# Patient Record
Sex: MALE | Race: WHITE | NOT HISPANIC OR LATINO | Employment: FULL TIME | URBAN - METROPOLITAN AREA
[De-identification: names, ages, dates, MRNs, and addresses within clinical notes are randomized per-mention and may not be internally consistent; named-entity substitution may affect disease eponyms.]

---

## 2017-01-03 ENCOUNTER — COMMUNICATION - HEALTHEAST (OUTPATIENT)
Dept: INTERNAL MEDICINE | Facility: CLINIC | Age: 52
End: 2017-01-03

## 2017-01-03 DIAGNOSIS — G44.1 VASCULAR HEADACHE: ICD-10-CM

## 2017-01-27 ENCOUNTER — OFFICE VISIT - HEALTHEAST (OUTPATIENT)
Dept: INTERNAL MEDICINE | Facility: CLINIC | Age: 52
End: 2017-01-27

## 2017-01-27 ENCOUNTER — RECORDS - HEALTHEAST (OUTPATIENT)
Dept: GENERAL RADIOLOGY | Facility: CLINIC | Age: 52
End: 2017-01-27

## 2017-01-27 ENCOUNTER — RECORDS - HEALTHEAST (OUTPATIENT)
Dept: ADMINISTRATIVE | Facility: OTHER | Age: 52
End: 2017-01-27

## 2017-01-27 DIAGNOSIS — I10 HYPERTENSION: ICD-10-CM

## 2017-01-27 DIAGNOSIS — R05.3 CHRONIC COUGH: ICD-10-CM

## 2017-01-27 DIAGNOSIS — M70.60 TROCHANTERIC BURSITIS: ICD-10-CM

## 2017-01-27 DIAGNOSIS — R53.83 FATIGUE: ICD-10-CM

## 2017-01-27 DIAGNOSIS — R05.9 COUGH: ICD-10-CM

## 2017-01-27 DIAGNOSIS — R06.83 SNORING: ICD-10-CM

## 2017-01-27 ASSESSMENT — MIFFLIN-ST. JEOR: SCORE: 2255.31

## 2017-02-06 ENCOUNTER — COMMUNICATION - HEALTHEAST (OUTPATIENT)
Dept: INTERNAL MEDICINE | Facility: CLINIC | Age: 52
End: 2017-02-06

## 2017-02-16 ENCOUNTER — OFFICE VISIT - HEALTHEAST (OUTPATIENT)
Dept: SLEEP MEDICINE | Facility: CLINIC | Age: 52
End: 2017-02-16

## 2017-02-16 DIAGNOSIS — Z91.89 AT RISK FOR SLEEP APNEA: ICD-10-CM

## 2017-02-16 DIAGNOSIS — G47.50 PARASOMNIA: ICD-10-CM

## 2017-02-16 DIAGNOSIS — G47.10 HYPERSOMNIA, UNSPECIFIED: ICD-10-CM

## 2017-02-16 DIAGNOSIS — R06.83 SNORING: ICD-10-CM

## 2017-02-16 ASSESSMENT — MIFFLIN-ST. JEOR: SCORE: 2264.21

## 2017-03-03 ENCOUNTER — RECORDS - HEALTHEAST (OUTPATIENT)
Dept: SLEEP MEDICINE | Facility: CLINIC | Age: 52
End: 2017-03-03

## 2017-03-03 DIAGNOSIS — G47.50 PARASOMNIA, UNSPECIFIED: ICD-10-CM

## 2017-03-03 DIAGNOSIS — Z91.89 OTHER SPECIFIED PERSONAL RISK FACTORS, NOT ELSEWHERE CLASSIFIED: ICD-10-CM

## 2017-03-03 DIAGNOSIS — G47.10 HYPERSOMNIA, UNSPECIFIED: ICD-10-CM

## 2017-03-03 DIAGNOSIS — R06.83 SNORING: ICD-10-CM

## 2017-03-20 ENCOUNTER — AMBULATORY - HEALTHEAST (OUTPATIENT)
Dept: SLEEP MEDICINE | Facility: CLINIC | Age: 52
End: 2017-03-20

## 2017-03-21 ENCOUNTER — COMMUNICATION - HEALTHEAST (OUTPATIENT)
Dept: SLEEP MEDICINE | Facility: CLINIC | Age: 52
End: 2017-03-21

## 2017-03-22 ENCOUNTER — AMBULATORY - HEALTHEAST (OUTPATIENT)
Dept: SLEEP MEDICINE | Facility: CLINIC | Age: 52
End: 2017-03-22

## 2017-03-22 ENCOUNTER — OFFICE VISIT - HEALTHEAST (OUTPATIENT)
Dept: SLEEP MEDICINE | Facility: CLINIC | Age: 52
End: 2017-03-22

## 2017-03-22 DIAGNOSIS — G47.33 OSA (OBSTRUCTIVE SLEEP APNEA): ICD-10-CM

## 2017-03-22 ASSESSMENT — MIFFLIN-ST. JEOR: SCORE: 2264.21

## 2017-03-25 ENCOUNTER — COMMUNICATION - HEALTHEAST (OUTPATIENT)
Dept: INTERNAL MEDICINE | Facility: CLINIC | Age: 52
End: 2017-03-25

## 2017-03-27 ENCOUNTER — COMMUNICATION - HEALTHEAST (OUTPATIENT)
Dept: INTERNAL MEDICINE | Facility: CLINIC | Age: 52
End: 2017-03-27

## 2017-03-27 DIAGNOSIS — F32.A CHRONIC DEPRESSION: ICD-10-CM

## 2017-05-08 ENCOUNTER — OFFICE VISIT - HEALTHEAST (OUTPATIENT)
Dept: FAMILY MEDICINE | Facility: CLINIC | Age: 52
End: 2017-05-08

## 2017-05-08 DIAGNOSIS — M54.50 LOW BACK PAIN: ICD-10-CM

## 2017-05-31 ENCOUNTER — OFFICE VISIT - HEALTHEAST (OUTPATIENT)
Dept: SLEEP MEDICINE | Facility: CLINIC | Age: 52
End: 2017-05-31

## 2017-05-31 DIAGNOSIS — G47.33 OSA ON CPAP: ICD-10-CM

## 2017-05-31 DIAGNOSIS — R09.81 NASAL CONGESTION: ICD-10-CM

## 2017-05-31 ASSESSMENT — MIFFLIN-ST. JEOR: SCORE: 2241.53

## 2017-06-09 ENCOUNTER — OFFICE VISIT - HEALTHEAST (OUTPATIENT)
Dept: INTERNAL MEDICINE | Facility: CLINIC | Age: 52
End: 2017-06-09

## 2017-06-09 DIAGNOSIS — F32.A CHRONIC DEPRESSION: ICD-10-CM

## 2017-06-09 DIAGNOSIS — E66.811 OBESITY (BMI 30.0-34.9): ICD-10-CM

## 2017-06-09 DIAGNOSIS — Z00.00 ROUTINE GENERAL MEDICAL EXAMINATION AT A HEALTH CARE FACILITY: ICD-10-CM

## 2017-06-09 DIAGNOSIS — G47.33 OSA (OBSTRUCTIVE SLEEP APNEA): ICD-10-CM

## 2017-06-09 LAB
CHOLEST SERPL-MCNC: 137 MG/DL
FASTING STATUS PATIENT QL REPORTED: YES
HDLC SERPL-MCNC: 37 MG/DL
LDLC SERPL CALC-MCNC: 84 MG/DL
PSA SERPL-MCNC: 0.8 NG/ML (ref 0–2.5)
TRIGL SERPL-MCNC: 81 MG/DL

## 2017-06-09 ASSESSMENT — MIFFLIN-ST. JEOR: SCORE: 2247.79

## 2018-02-02 ENCOUNTER — COMMUNICATION - HEALTHEAST (OUTPATIENT)
Dept: INTERNAL MEDICINE | Facility: CLINIC | Age: 53
End: 2018-02-02

## 2018-02-02 DIAGNOSIS — G44.1 VASCULAR HEADACHE: ICD-10-CM

## 2018-02-27 ENCOUNTER — COMMUNICATION - HEALTHEAST (OUTPATIENT)
Dept: INTERNAL MEDICINE | Facility: CLINIC | Age: 53
End: 2018-02-27

## 2018-02-28 ENCOUNTER — AMBULATORY - HEALTHEAST (OUTPATIENT)
Dept: INTERNAL MEDICINE | Facility: CLINIC | Age: 53
End: 2018-02-28

## 2018-02-28 DIAGNOSIS — K40.90 INGUINAL HERNIA: ICD-10-CM

## 2018-03-13 ENCOUNTER — RECORDS - HEALTHEAST (OUTPATIENT)
Dept: ADMINISTRATIVE | Facility: OTHER | Age: 53
End: 2018-03-13

## 2018-04-01 ENCOUNTER — COMMUNICATION - HEALTHEAST (OUTPATIENT)
Dept: INTERNAL MEDICINE | Facility: CLINIC | Age: 53
End: 2018-04-01

## 2018-04-01 DIAGNOSIS — F32.A CHRONIC DEPRESSION: ICD-10-CM

## 2018-05-29 ENCOUNTER — COMMUNICATION - HEALTHEAST (OUTPATIENT)
Dept: INTERNAL MEDICINE | Facility: CLINIC | Age: 53
End: 2018-05-29

## 2018-05-30 ENCOUNTER — OFFICE VISIT - HEALTHEAST (OUTPATIENT)
Dept: INTERNAL MEDICINE | Facility: CLINIC | Age: 53
End: 2018-05-30

## 2018-05-30 DIAGNOSIS — E66.811 OBESITY (BMI 30.0-34.9): ICD-10-CM

## 2018-05-30 DIAGNOSIS — G47.33 OSA (OBSTRUCTIVE SLEEP APNEA): ICD-10-CM

## 2018-05-30 DIAGNOSIS — Z12.11 SCREEN FOR COLON CANCER: ICD-10-CM

## 2018-05-30 DIAGNOSIS — F32.A CHRONIC DEPRESSION: ICD-10-CM

## 2018-05-30 DIAGNOSIS — K40.90 INGUINAL HERNIA: ICD-10-CM

## 2018-05-30 LAB
ALBUMIN SERPL-MCNC: 4 G/DL (ref 3.5–5)
ALP SERPL-CCNC: 62 U/L (ref 45–120)
ALT SERPL W P-5'-P-CCNC: 23 U/L (ref 0–45)
ANION GAP SERPL CALCULATED.3IONS-SCNC: 7 MMOL/L (ref 5–18)
AST SERPL W P-5'-P-CCNC: 18 U/L (ref 0–40)
ATRIAL RATE - MUSE: 62 BPM
BASOPHILS # BLD AUTO: 0.1 THOU/UL (ref 0–0.2)
BASOPHILS NFR BLD AUTO: 1 % (ref 0–2)
BILIRUB SERPL-MCNC: 0.5 MG/DL (ref 0–1)
BUN SERPL-MCNC: 17 MG/DL (ref 8–22)
CALCIUM SERPL-MCNC: 9.7 MG/DL (ref 8.5–10.5)
CHLORIDE BLD-SCNC: 107 MMOL/L (ref 98–107)
CO2 SERPL-SCNC: 27 MMOL/L (ref 22–31)
CREAT SERPL-MCNC: 1.25 MG/DL (ref 0.7–1.3)
DIASTOLIC BLOOD PRESSURE - MUSE: NORMAL MMHG
EOSINOPHIL # BLD AUTO: 0.2 THOU/UL (ref 0–0.4)
EOSINOPHIL NFR BLD AUTO: 2 % (ref 0–6)
ERYTHROCYTE [DISTWIDTH] IN BLOOD BY AUTOMATED COUNT: 11.5 % (ref 11–14.5)
GFR SERPL CREATININE-BSD FRML MDRD: >60 ML/MIN/1.73M2
GLUCOSE BLD-MCNC: 98 MG/DL (ref 70–125)
HCT VFR BLD AUTO: 44.4 % (ref 40–54)
HGB BLD-MCNC: 15.2 G/DL (ref 14–18)
INTERPRETATION ECG - MUSE: NORMAL
LYMPHOCYTES # BLD AUTO: 2.8 THOU/UL (ref 0.8–4.4)
LYMPHOCYTES NFR BLD AUTO: 30 % (ref 20–40)
MCH RBC QN AUTO: 31.6 PG (ref 27–34)
MCHC RBC AUTO-ENTMCNC: 34.1 G/DL (ref 32–36)
MCV RBC AUTO: 93 FL (ref 80–100)
MONOCYTES # BLD AUTO: 0.8 THOU/UL (ref 0–0.9)
MONOCYTES NFR BLD AUTO: 8 % (ref 2–10)
NEUTROPHILS # BLD AUTO: 5.4 THOU/UL (ref 2–7.7)
NEUTROPHILS NFR BLD AUTO: 59 % (ref 50–70)
P AXIS - MUSE: 68 DEGREES
PLATELET # BLD AUTO: 256 THOU/UL (ref 140–440)
PMV BLD AUTO: 8.1 FL (ref 7–10)
POTASSIUM BLD-SCNC: 4.2 MMOL/L (ref 3.5–5)
PR INTERVAL - MUSE: 186 MS
PROT SERPL-MCNC: 7.1 G/DL (ref 6–8)
QRS DURATION - MUSE: 86 MS
QT - MUSE: 396 MS
QTC - MUSE: 401 MS
R AXIS - MUSE: -4 DEGREES
RBC # BLD AUTO: 4.8 MILL/UL (ref 4.4–6.2)
SODIUM SERPL-SCNC: 141 MMOL/L (ref 136–145)
SYSTOLIC BLOOD PRESSURE - MUSE: NORMAL MMHG
T AXIS - MUSE: 5 DEGREES
VENTRICULAR RATE- MUSE: 62 BPM
WBC: 9.2 THOU/UL (ref 4–11)

## 2018-05-30 ASSESSMENT — MIFFLIN-ST. JEOR: SCORE: 2243.97

## 2018-06-19 ENCOUNTER — RECORDS - HEALTHEAST (OUTPATIENT)
Dept: ADMINISTRATIVE | Facility: OTHER | Age: 53
End: 2018-06-19

## 2018-07-03 ENCOUNTER — COMMUNICATION - HEALTHEAST (OUTPATIENT)
Dept: INTERNAL MEDICINE | Facility: CLINIC | Age: 53
End: 2018-07-03

## 2018-07-03 DIAGNOSIS — F32.A CHRONIC DEPRESSION: ICD-10-CM

## 2018-12-24 ENCOUNTER — COMMUNICATION - HEALTHEAST (OUTPATIENT)
Dept: INTERNAL MEDICINE | Facility: CLINIC | Age: 53
End: 2018-12-24

## 2018-12-24 DIAGNOSIS — F32.A CHRONIC DEPRESSION: ICD-10-CM

## 2018-12-26 ENCOUNTER — COMMUNICATION - HEALTHEAST (OUTPATIENT)
Dept: INTERNAL MEDICINE | Facility: CLINIC | Age: 53
End: 2018-12-26

## 2018-12-26 DIAGNOSIS — J11.1 INFLUENZA: ICD-10-CM

## 2019-04-24 ENCOUNTER — COMMUNICATION - HEALTHEAST (OUTPATIENT)
Dept: INTERNAL MEDICINE | Facility: CLINIC | Age: 54
End: 2019-04-24

## 2019-04-24 DIAGNOSIS — F32.A CHRONIC DEPRESSION: ICD-10-CM

## 2019-04-29 ENCOUNTER — COMMUNICATION - HEALTHEAST (OUTPATIENT)
Dept: INTERNAL MEDICINE | Facility: CLINIC | Age: 54
End: 2019-04-29

## 2019-04-29 DIAGNOSIS — F32.A CHRONIC DEPRESSION: ICD-10-CM

## 2019-05-15 ENCOUNTER — OFFICE VISIT - HEALTHEAST (OUTPATIENT)
Dept: INTERNAL MEDICINE | Facility: CLINIC | Age: 54
End: 2019-05-15

## 2019-05-15 DIAGNOSIS — G44.1 VASCULAR HEADACHE: ICD-10-CM

## 2019-05-15 DIAGNOSIS — E66.811 OBESITY (BMI 30.0-34.9): ICD-10-CM

## 2019-05-15 DIAGNOSIS — Z00.00 ROUTINE GENERAL MEDICAL EXAMINATION AT A HEALTH CARE FACILITY: ICD-10-CM

## 2019-05-15 DIAGNOSIS — G47.33 OSA (OBSTRUCTIVE SLEEP APNEA): ICD-10-CM

## 2019-05-15 LAB
ALBUMIN SERPL-MCNC: 4.4 G/DL (ref 3.5–5)
ALBUMIN UR-MCNC: NEGATIVE MG/DL
ALP SERPL-CCNC: 70 U/L (ref 45–120)
ALT SERPL W P-5'-P-CCNC: 22 U/L (ref 0–45)
ANION GAP SERPL CALCULATED.3IONS-SCNC: 11 MMOL/L (ref 5–18)
APPEARANCE UR: CLEAR
AST SERPL W P-5'-P-CCNC: 18 U/L (ref 0–40)
BASOPHILS # BLD AUTO: 0 THOU/UL (ref 0–0.2)
BASOPHILS NFR BLD AUTO: 0 % (ref 0–2)
BILIRUB DIRECT SERPL-MCNC: 0.3 MG/DL
BILIRUB SERPL-MCNC: 0.9 MG/DL (ref 0–1)
BILIRUB UR QL STRIP: NEGATIVE
BUN SERPL-MCNC: 13 MG/DL (ref 8–22)
CALCIUM SERPL-MCNC: 10.1 MG/DL (ref 8.5–10.5)
CHLORIDE BLD-SCNC: 103 MMOL/L (ref 98–107)
CHOLEST SERPL-MCNC: 141 MG/DL
CO2 SERPL-SCNC: 25 MMOL/L (ref 22–31)
COLOR UR AUTO: YELLOW
CREAT SERPL-MCNC: 1.11 MG/DL (ref 0.7–1.3)
EOSINOPHIL # BLD AUTO: 0.1 THOU/UL (ref 0–0.4)
EOSINOPHIL NFR BLD AUTO: 1 % (ref 0–6)
ERYTHROCYTE [DISTWIDTH] IN BLOOD BY AUTOMATED COUNT: 12.4 % (ref 11–14.5)
ERYTHROCYTE [SEDIMENTATION RATE] IN BLOOD BY WESTERGREN METHOD: 2 MM/HR (ref 0–15)
FASTING STATUS PATIENT QL REPORTED: YES
GFR SERPL CREATININE-BSD FRML MDRD: >60 ML/MIN/1.73M2
GLUCOSE BLD-MCNC: 95 MG/DL (ref 70–125)
GLUCOSE UR STRIP-MCNC: NEGATIVE MG/DL
HCT VFR BLD AUTO: 48.5 % (ref 40–54)
HDLC SERPL-MCNC: 47 MG/DL
HGB BLD-MCNC: 15.8 G/DL (ref 14–18)
HGB UR QL STRIP: NEGATIVE
KETONES UR STRIP-MCNC: NEGATIVE MG/DL
LDLC SERPL CALC-MCNC: 72 MG/DL
LEUKOCYTE ESTERASE UR QL STRIP: NEGATIVE
LYMPHOCYTES # BLD AUTO: 2.2 THOU/UL (ref 0.8–4.4)
LYMPHOCYTES NFR BLD AUTO: 20 % (ref 20–40)
MCH RBC QN AUTO: 30.7 PG (ref 27–34)
MCHC RBC AUTO-ENTMCNC: 32.6 G/DL (ref 32–36)
MCV RBC AUTO: 94 FL (ref 80–100)
MONOCYTES # BLD AUTO: 0.9 THOU/UL (ref 0–0.9)
MONOCYTES NFR BLD AUTO: 8 % (ref 2–10)
NEUTROPHILS # BLD AUTO: 7.8 THOU/UL (ref 2–7.7)
NEUTROPHILS NFR BLD AUTO: 71 % (ref 50–70)
NITRATE UR QL: NEGATIVE
PH UR STRIP: 5.5 [PH] (ref 4.5–8)
PLATELET # BLD AUTO: 291 THOU/UL (ref 140–440)
PMV BLD AUTO: 10.9 FL (ref 8.5–12.5)
POTASSIUM BLD-SCNC: 4.4 MMOL/L (ref 3.5–5)
PROT SERPL-MCNC: 7.2 G/DL (ref 6–8)
PSA SERPL-MCNC: 0.3 NG/ML (ref 0–3.5)
RBC # BLD AUTO: 5.15 MILL/UL (ref 4.4–6.2)
SODIUM SERPL-SCNC: 139 MMOL/L (ref 136–145)
SP GR UR STRIP: 1.01 (ref 1–1.03)
TRIGL SERPL-MCNC: 109 MG/DL
TSH SERPL DL<=0.005 MIU/L-ACNC: 1.03 UIU/ML (ref 0.3–5)
UROBILINOGEN UR STRIP-ACNC: NORMAL
WBC: 11.1 THOU/UL (ref 4–11)

## 2019-05-15 ASSESSMENT — MIFFLIN-ST. JEOR: SCORE: 2243.79

## 2019-06-10 ENCOUNTER — COMMUNICATION - HEALTHEAST (OUTPATIENT)
Dept: INTERNAL MEDICINE | Facility: CLINIC | Age: 54
End: 2019-06-10

## 2019-06-10 DIAGNOSIS — G44.1 VASCULAR HEADACHE: ICD-10-CM

## 2019-07-17 ENCOUNTER — RECORDS - HEALTHEAST (OUTPATIENT)
Dept: ADMINISTRATIVE | Facility: OTHER | Age: 54
End: 2019-07-17

## 2020-01-08 ENCOUNTER — AMBULATORY - HEALTHEAST (OUTPATIENT)
Dept: INTERNAL MEDICINE | Facility: CLINIC | Age: 55
End: 2020-01-08

## 2020-01-08 ENCOUNTER — COMMUNICATION - HEALTHEAST (OUTPATIENT)
Dept: SCHEDULING | Facility: CLINIC | Age: 55
End: 2020-01-08

## 2020-01-08 DIAGNOSIS — I10 HYPERTENSION, UNSPECIFIED TYPE: ICD-10-CM

## 2020-01-09 ENCOUNTER — COMMUNICATION - HEALTHEAST (OUTPATIENT)
Dept: INTERNAL MEDICINE | Facility: CLINIC | Age: 55
End: 2020-01-09

## 2020-01-31 ENCOUNTER — COMMUNICATION - HEALTHEAST (OUTPATIENT)
Dept: INTERNAL MEDICINE | Facility: CLINIC | Age: 55
End: 2020-01-31

## 2020-01-31 ENCOUNTER — OFFICE VISIT - HEALTHEAST (OUTPATIENT)
Dept: INTERNAL MEDICINE | Facility: CLINIC | Age: 55
End: 2020-01-31

## 2020-01-31 DIAGNOSIS — F32.A CHRONIC DEPRESSION: ICD-10-CM

## 2020-01-31 DIAGNOSIS — G44.209 TENSION HEADACHE: ICD-10-CM

## 2020-01-31 DIAGNOSIS — I10 HYPERTENSION, UNSPECIFIED TYPE: ICD-10-CM

## 2020-01-31 LAB
ANION GAP SERPL CALCULATED.3IONS-SCNC: 9 MMOL/L (ref 5–18)
BASOPHILS # BLD AUTO: 0.1 THOU/UL (ref 0–0.2)
BASOPHILS NFR BLD AUTO: 1 % (ref 0–2)
BUN SERPL-MCNC: 14 MG/DL (ref 8–22)
CALCIUM SERPL-MCNC: 9.3 MG/DL (ref 8.5–10.5)
CHLORIDE BLD-SCNC: 104 MMOL/L (ref 98–107)
CO2 SERPL-SCNC: 27 MMOL/L (ref 22–31)
CREAT SERPL-MCNC: 1.12 MG/DL (ref 0.7–1.3)
EOSINOPHIL # BLD AUTO: 0.2 THOU/UL (ref 0–0.4)
EOSINOPHIL NFR BLD AUTO: 2 % (ref 0–6)
ERYTHROCYTE [DISTWIDTH] IN BLOOD BY AUTOMATED COUNT: 12 % (ref 11–14.5)
GFR SERPL CREATININE-BSD FRML MDRD: >60 ML/MIN/1.73M2
GLUCOSE BLD-MCNC: 103 MG/DL (ref 70–125)
HCT VFR BLD AUTO: 45.7 % (ref 40–54)
HGB BLD-MCNC: 15.7 G/DL (ref 14–18)
LYMPHOCYTES # BLD AUTO: 2.7 THOU/UL (ref 0.8–4.4)
LYMPHOCYTES NFR BLD AUTO: 36 % (ref 20–40)
MCH RBC QN AUTO: 31.7 PG (ref 27–34)
MCHC RBC AUTO-ENTMCNC: 34.3 G/DL (ref 32–36)
MCV RBC AUTO: 92 FL (ref 80–100)
MONOCYTES # BLD AUTO: 0.6 THOU/UL (ref 0–0.9)
MONOCYTES NFR BLD AUTO: 8 % (ref 2–10)
NEUTROPHILS # BLD AUTO: 4 THOU/UL (ref 2–7.7)
NEUTROPHILS NFR BLD AUTO: 53 % (ref 50–70)
PLATELET # BLD AUTO: 238 THOU/UL (ref 140–440)
PMV BLD AUTO: 9 FL (ref 7–10)
POTASSIUM BLD-SCNC: 4.4 MMOL/L (ref 3.5–5)
RBC # BLD AUTO: 4.95 MILL/UL (ref 4.4–6.2)
SODIUM SERPL-SCNC: 140 MMOL/L (ref 136–145)
WBC: 7.5 THOU/UL (ref 4–11)

## 2020-01-31 RX ORDER — ASCORBIC ACID 500 MG
500 TABLET ORAL DAILY
Status: SHIPPED | COMMUNITY
Start: 2020-01-31 | End: 2023-11-13

## 2020-01-31 ASSESSMENT — MIFFLIN-ST. JEOR: SCORE: 2276.08

## 2020-02-05 ENCOUNTER — OFFICE VISIT - HEALTHEAST (OUTPATIENT)
Dept: INTERNAL MEDICINE | Facility: CLINIC | Age: 55
End: 2020-02-05

## 2020-02-06 ENCOUNTER — COMMUNICATION - HEALTHEAST (OUTPATIENT)
Dept: SCHEDULING | Facility: CLINIC | Age: 55
End: 2020-02-06

## 2020-02-14 ENCOUNTER — COMMUNICATION - HEALTHEAST (OUTPATIENT)
Dept: INTERNAL MEDICINE | Facility: CLINIC | Age: 55
End: 2020-02-14

## 2020-02-14 DIAGNOSIS — I10 HYPERTENSION, UNSPECIFIED TYPE: ICD-10-CM

## 2020-02-17 ENCOUNTER — COMMUNICATION - HEALTHEAST (OUTPATIENT)
Dept: INTERNAL MEDICINE | Facility: CLINIC | Age: 55
End: 2020-02-17

## 2020-02-17 DIAGNOSIS — G44.1 VASCULAR HEADACHE: ICD-10-CM

## 2020-09-20 ENCOUNTER — COMMUNICATION - HEALTHEAST (OUTPATIENT)
Dept: INTERNAL MEDICINE | Facility: CLINIC | Age: 55
End: 2020-09-20

## 2020-09-20 DIAGNOSIS — I10 HYPERTENSION, UNSPECIFIED TYPE: ICD-10-CM

## 2020-10-06 ENCOUNTER — COMMUNICATION - HEALTHEAST (OUTPATIENT)
Dept: INTERNAL MEDICINE | Facility: CLINIC | Age: 55
End: 2020-10-06

## 2020-10-06 DIAGNOSIS — I10 HYPERTENSION, UNSPECIFIED TYPE: ICD-10-CM

## 2020-10-09 RX ORDER — TRIAMTERENE AND HYDROCHLOROTHIAZIDE 37.5; 25 MG/1; MG/1
1 CAPSULE ORAL DAILY
Qty: 90 CAPSULE | Refills: 3 | Status: SHIPPED | OUTPATIENT
Start: 2020-10-09 | End: 2021-08-23

## 2020-11-05 ENCOUNTER — COMMUNICATION - HEALTHEAST (OUTPATIENT)
Dept: SCHEDULING | Facility: CLINIC | Age: 55
End: 2020-11-05

## 2021-02-01 DIAGNOSIS — G47.33 OBSTRUCTIVE SLEEP APNEA (ADULT) (PEDIATRIC): Primary | ICD-10-CM

## 2021-02-24 ENCOUNTER — COMMUNICATION - HEALTHEAST (OUTPATIENT)
Dept: INTERNAL MEDICINE | Facility: CLINIC | Age: 56
End: 2021-02-24

## 2021-02-24 DIAGNOSIS — G44.1 VASCULAR HEADACHE: ICD-10-CM

## 2021-02-25 ENCOUNTER — COMMUNICATION - HEALTHEAST (OUTPATIENT)
Dept: INTERNAL MEDICINE | Facility: CLINIC | Age: 56
End: 2021-02-25

## 2021-03-19 ENCOUNTER — AMBULATORY - HEALTHEAST (OUTPATIENT)
Dept: NURSING | Facility: CLINIC | Age: 56
End: 2021-03-19

## 2021-04-09 ENCOUNTER — OFFICE VISIT - HEALTHEAST (OUTPATIENT)
Dept: INTERNAL MEDICINE | Facility: CLINIC | Age: 56
End: 2021-04-09

## 2021-04-09 ENCOUNTER — AMBULATORY - HEALTHEAST (OUTPATIENT)
Dept: NURSING | Facility: CLINIC | Age: 56
End: 2021-04-09

## 2021-04-09 DIAGNOSIS — N40.1 BENIGN PROSTATIC HYPERPLASIA WITH NOCTURIA: ICD-10-CM

## 2021-04-09 DIAGNOSIS — G44.1 VASCULAR HEADACHE: ICD-10-CM

## 2021-04-09 DIAGNOSIS — Z13.220 SCREENING FOR HYPERLIPIDEMIA: ICD-10-CM

## 2021-04-09 DIAGNOSIS — E66.9 OBESITY (BMI 30-39.9): ICD-10-CM

## 2021-04-09 DIAGNOSIS — I10 ESSENTIAL HYPERTENSION: ICD-10-CM

## 2021-04-09 DIAGNOSIS — G43.009 MIGRAINE WITHOUT AURA AND WITHOUT STATUS MIGRAINOSUS, NOT INTRACTABLE: ICD-10-CM

## 2021-04-09 DIAGNOSIS — Z12.5 SCREENING PSA (PROSTATE SPECIFIC ANTIGEN): ICD-10-CM

## 2021-04-09 DIAGNOSIS — F41.9 ANXIETY: ICD-10-CM

## 2021-04-09 DIAGNOSIS — G47.33 OSA (OBSTRUCTIVE SLEEP APNEA): ICD-10-CM

## 2021-04-09 DIAGNOSIS — R35.1 BENIGN PROSTATIC HYPERPLASIA WITH NOCTURIA: ICD-10-CM

## 2021-04-09 DIAGNOSIS — Z12.11 SPECIAL SCREENING FOR MALIGNANT NEOPLASMS, COLON: ICD-10-CM

## 2021-04-09 DIAGNOSIS — E66.01 MORBID OBESITY (H): ICD-10-CM

## 2021-04-09 DIAGNOSIS — Z13.1 SCREENING FOR DIABETES MELLITUS: ICD-10-CM

## 2021-04-09 RX ORDER — SUMATRIPTAN 100 MG/1
TABLET, FILM COATED ORAL
Qty: 30 TABLET | Refills: 1 | Status: SHIPPED | OUTPATIENT
Start: 2021-04-09 | End: 2022-01-29

## 2021-04-20 ENCOUNTER — AMBULATORY - HEALTHEAST (OUTPATIENT)
Dept: FAMILY MEDICINE | Facility: CLINIC | Age: 56
End: 2021-04-20

## 2021-04-20 ENCOUNTER — COMMUNICATION - HEALTHEAST (OUTPATIENT)
Dept: FAMILY MEDICINE | Facility: CLINIC | Age: 56
End: 2021-04-20

## 2021-04-20 DIAGNOSIS — Z23 NEED FOR VACCINATION: ICD-10-CM

## 2021-04-26 ENCOUNTER — AMBULATORY - HEALTHEAST (OUTPATIENT)
Dept: LAB | Facility: CLINIC | Age: 56
End: 2021-04-26

## 2021-04-26 ENCOUNTER — AMBULATORY - HEALTHEAST (OUTPATIENT)
Dept: NURSING | Facility: CLINIC | Age: 56
End: 2021-04-26

## 2021-04-26 ENCOUNTER — COMMUNICATION - HEALTHEAST (OUTPATIENT)
Dept: FAMILY MEDICINE | Facility: CLINIC | Age: 56
End: 2021-04-26

## 2021-04-26 DIAGNOSIS — Z12.5 SCREENING PSA (PROSTATE SPECIFIC ANTIGEN): ICD-10-CM

## 2021-04-26 DIAGNOSIS — E66.9 OBESITY (BMI 30-39.9): ICD-10-CM

## 2021-04-26 DIAGNOSIS — Z23 NEED FOR VACCINATION: ICD-10-CM

## 2021-04-26 DIAGNOSIS — I10 ESSENTIAL HYPERTENSION: ICD-10-CM

## 2021-04-26 DIAGNOSIS — Z13.220 SCREENING FOR HYPERLIPIDEMIA: ICD-10-CM

## 2021-04-26 DIAGNOSIS — Z13.1 SCREENING FOR DIABETES MELLITUS: ICD-10-CM

## 2021-04-26 DIAGNOSIS — Z23 NEED FOR SHINGLES VACCINE: ICD-10-CM

## 2021-04-26 LAB
ALBUMIN SERPL-MCNC: 4.2 G/DL (ref 3.5–5)
ALP SERPL-CCNC: 67 U/L (ref 45–120)
ALT SERPL W P-5'-P-CCNC: 17 U/L (ref 0–45)
ANION GAP SERPL CALCULATED.3IONS-SCNC: 10 MMOL/L (ref 5–18)
AST SERPL W P-5'-P-CCNC: 17 U/L (ref 0–40)
BILIRUB SERPL-MCNC: 0.7 MG/DL (ref 0–1)
BUN SERPL-MCNC: 16 MG/DL (ref 8–22)
CALCIUM SERPL-MCNC: 9.6 MG/DL (ref 8.5–10.5)
CHLORIDE BLD-SCNC: 102 MMOL/L (ref 98–107)
CHOLEST SERPL-MCNC: 149 MG/DL
CO2 SERPL-SCNC: 25 MMOL/L (ref 22–31)
CREAT SERPL-MCNC: 1.06 MG/DL (ref 0.7–1.3)
ERYTHROCYTE [DISTWIDTH] IN BLOOD BY AUTOMATED COUNT: 12.2 % (ref 11–14.5)
FASTING STATUS PATIENT QL REPORTED: NO
GFR SERPL CREATININE-BSD FRML MDRD: >60 ML/MIN/1.73M2
GLUCOSE BLD-MCNC: 93 MG/DL (ref 70–125)
HBA1C MFR BLD: 5.5 %
HCT VFR BLD AUTO: 48 % (ref 40–54)
HDLC SERPL-MCNC: 46 MG/DL
HGB BLD-MCNC: 16.2 G/DL (ref 14–18)
LDLC SERPL CALC-MCNC: 83 MG/DL
MCH RBC QN AUTO: 30.8 PG (ref 27–34)
MCHC RBC AUTO-ENTMCNC: 33.8 G/DL (ref 32–36)
MCV RBC AUTO: 91 FL (ref 80–100)
PLATELET # BLD AUTO: 260 THOU/UL (ref 140–440)
PMV BLD AUTO: 10.3 FL (ref 7–10)
POTASSIUM BLD-SCNC: 4.1 MMOL/L (ref 3.5–5)
PROT SERPL-MCNC: 7.1 G/DL (ref 6–8)
PSA SERPL-MCNC: 0.4 NG/ML (ref 0–3.5)
RBC # BLD AUTO: 5.26 MILL/UL (ref 4.4–6.2)
SODIUM SERPL-SCNC: 137 MMOL/L (ref 136–145)
TRIGL SERPL-MCNC: 102 MG/DL
TSH SERPL DL<=0.005 MIU/L-ACNC: 0.89 UIU/ML (ref 0.3–5)
WBC: 9.5 THOU/UL (ref 4–11)

## 2021-04-27 ENCOUNTER — COMMUNICATION - HEALTHEAST (OUTPATIENT)
Dept: INTERNAL MEDICINE | Facility: CLINIC | Age: 56
End: 2021-04-27

## 2021-04-27 LAB — 25(OH)D3 SERPL-MCNC: 38.2 NG/ML (ref 30–80)

## 2021-05-28 NOTE — TELEPHONE ENCOUNTER
Due to be seen    Rx renewed per Medication Renewal Policy. Medication was last renewed on 12/24/18.    Soumya Sam, [facility] Triage/Med Refill 4/26/2019     Requested Prescriptions   Pending Prescriptions Disp Refills     buPROPion (WELLBUTRIN SR) 150 MG 12 hr tablet [Pharmacy Med Name: BUPROPION HCL SR TABS 150MG] 180 tablet 0     Sig: TAKE 1 TABLET TWICE A DAY       Tricyclics/Misc Antidepressant/Antianxiety Meds Refill Protocol Passed - 4/24/2019  7:17 AM        Passed - PCP or prescribing provider visit in last year     Last office visit with prescriber/PCP: Visit date not found OR same dept: Visit date not found OR same specialty: 1/27/2017 Lukas Vicente MD  Last physical: Visit date not found Last MTM visit: Visit date not found   Next visit within 3 mo: Visit date not found  Next physical within 3 mo: Visit date not found  Prescriber OR PCP: Lukas Muniz MD  Last diagnosis associated with med order: 1. Chronic depression  - buPROPion (WELLBUTRIN SR) 150 MG 12 hr tablet [Pharmacy Med Name: BUPROPION HCL SR TABS 150MG]; TAKE 1 TABLET TWICE A DAY  Dispense: 180 tablet; Refill: 0    If protocol passes may refill for 12 months if within 3 months of last provider visit (or a total of 15 months).

## 2021-05-28 NOTE — TELEPHONE ENCOUNTER
Medication Question or Clarification  Who is calling: Patient  What medication are you calling about?:  buPROPion (WELLBUTRIN SR) 150 MG 12 hr tablet  What dose do you take?:   How often are you taking the medication?:   Who prescribed the medication?: Lukas Vicente MD  What is your question/concern ?:  Pt needs 5 days supply until he receives his mail order RX  Pharmacy:   Okay to leave a detailed message?: Yes  Site CMT - Please call the pharmacy to obtain any additional needed information.

## 2021-05-28 NOTE — TELEPHONE ENCOUNTER
Prescription set up in a separate refill encounter to be signed by Dr Iraheta, the covering provider. Patient has been notified.    Twila Orantes, CMA

## 2021-05-28 NOTE — PROGRESS NOTES
HISTORY/PHYSICAL EXAM  Jose Peres   53 y.o. male  Is here for a physical healthcare maintenance examination        Assessment/Plan for  Jose Peres is a 53 y.o. male.       1.  Healthcare maintenance examination-healthy man.  Good exercise a bit more.  Dylan weight loss.  2.  Vascular headache-stable  3.  Obstructive sleep apnea-compliant with Pap.  4.  Obesity.  Discussed diet exercise weight loss.  5.  Skin rash-unclear etiology.  Does not appear to be rosacea.  Papular.  I will have him see Dr. Gilson Fowler  6.  Depression-on Rx.  Doing well-needs exercise  Plan:  1.  Continue current Rx-CPAP machine.,  As needed Imitrex  2.  Laboratory  3.  Annual exam  4.  Relaxation/stress techniques discussed    The following high BMI interventions were performed this visit: encouragement to exercise, weight monitoring and prescribed dietary intake    There are no Patient Instructions on file for this visit.    Diagnoses and all orders for this visit:    Routine general medical examination at a health care facility  -     [facility](CBC and Differential)  -     Erythrocyte Sedimentation Rate  -     Urinalysis-UC if Indicated  -     Basic Metabolic Panel  -     Hepatic Profile  -     Lipid [place]  -     Thyroid [place]  -     PSA (Prostatic-Specific Antigen), Annual Screen  -     [facility] (CBC with Diff)    Vascular headache  -     SUMAtriptan (IMITREX) 100 MG tablet; TAKE 1 TABLET (100 MG TOTAL) BY MOUTH EVERY 2 (TWO) HOURS AS NEEDED FOR MIGRAINE.  Dispense: 10 tablet; Refill: 3    GARCÍA (obstructive sleep apnea)    Obesity (BMI 30.0-34.9)            Medications:  Medications after visit  Current Outpatient Medications   Medication Sig Dispense Refill     aspirin 81 MG EC tablet Take 81 mg by mouth daily.       buPROPion (WELLBUTRIN SR) 150 MG 12 hr tablet TAKE 1 TABLET TWICE A DAY 10 tablet 3     fluticasone (FLONASE) 50 mcg/actuation nasal spray 1 spray into each nostril daily. 16 g 12     multivitamin therapeutic (THERAGRAN) tablet  Take 1 tablet by mouth daily.       OMEGA-3-DHA-EPA-TUNA OIL ORAL Take by mouth daily.       SUMAtriptan (IMITREX) 100 MG tablet TAKE 1 TABLET (100 MG TOTAL) BY MOUTH EVERY 2 (TWO) HOURS AS NEEDED FOR MIGRAINE. 10 tablet 3     No current facility-administered medications for this visit.      In addition to the physical examination-his skin lesion was addressed and referral made, his medication refilled, reviewed history of depression obstructive sleep apnea and renewed medication.  This provider spent greater than 25-minute min. face-to-face time with the patient and/or his family.  More than half this time was spent in counseling and or coordination of care which was consistent with the nature of this patient's problems which are listed and described in the assessment and plan.       Lukas Vicente MD  Internal medicine  [facility]  [phone]  [email or web address]      This is an electronically verified report by Lukas Vicente M.D.  (Note created with Dragon voice recognition and unintended spelling errors and word substitutions may occur)        SUBJECTIVE/HPI    Chief Complaint:  Annual Exam (fasting)  Patient here for annual exam    He is doing well  His hernia surgery went well  Were no surgical or anesthesia complications    Patient did change jobs within [employer]  He is working many hours  He does not travel that much  There is stress  He is having some trouble sleeping    He does have migraine headaches.  They have not been exacerbated.  He will take Imitrex as needed with good effect.    He is not exercising as much  He does have a history of depression  He remains on chronic Wellbutrin 150 mg p.o. twice daily and is doing well  He is active at his [facility]        Review of Systems:   Extensive 14-point comprehensive review of systems was performed.   Patient reports  1.  Appetite is good  2.  Bowels regular  3.  Urination okay  4.  No inguinal  pain    Otherwise, the following systems are negative including constitutional, eyes, ears, nose and throat, cardiovascular, respiratory, gastrointestinal, genitourinary, musculoskeletal,neurological, skin and/or breast, endocrine, hematologic/lymph, allergic/immunologic and psychiatric.  Surgical History  Past Surgical History:   Procedure Laterality Date     KNEE ARTHROSCOPY Bilateral      PILONIDAL ABSCESS         Medical History     Past Medical History:   Diagnosis Date     Depression      HTN (hypertension)      Migraines      Nephrolithiasis 2008     Obesity (BMI 30-39.9)      OCD (obsessive compulsive disorder)      GARCÍA (obstructive sleep apnea)      Patient Active Problem List    Diagnosis Date Noted     GARCÍA (obstructive sleep apnea) 06/09/2017     Chronic cough 01/27/2017     Hypertension 01/27/2017     Chronic depression 11/25/2015     Obesity (BMI 30.0-34.9) 11/25/2015        Family History  Family History   Problem Relation Age of Onset     Hypertension Mother      Hypertension Father      Glaucoma Father         1944     Heart disease Unknown 40        FAMILY             Medications:  Current Outpatient Medications on File Prior to Visit   Medication Sig     aspirin 81 MG EC tablet Take 81 mg by mouth daily.     buPROPion (WELLBUTRIN SR) 150 MG 12 hr tablet TAKE 1 TABLET TWICE A DAY     fluticasone (FLONASE) 50 mcg/actuation nasal spray 1 spray into each nostril daily.     multivitamin therapeutic (THERAGRAN) tablet Take 1 tablet by mouth daily.     OMEGA-3-DHA-EPA-TUNA OIL ORAL Take by mouth daily.     [DISCONTINUED] buPROPion (WELLBUTRIN SR) 150 MG 12 hr tablet Take 150 mg by mouth.     [DISCONTINUED] cholecalciferol, vitamin D3, 1,000 unit tablet Take 1,000 Units by mouth daily.     [DISCONTINUED] SUMAtriptan (IMITREX) 100 MG tablet TAKE 1 TABLET (100 MG TOTAL) BY MOUTH EVERY 2 (TWO) HOURS AS NEEDED FOR MIGRAINE.     [DISCONTINUED] UNABLE TO FIND daily. Med Name: Catalyn (natural supplement)      "[DISCONTINUED] aspirin 81 MG EC tablet Take 81 mg by mouth.     [DISCONTINUED] multivitamin (ONE A DAY) per tablet Take 1 tablet by mouth.     No current facility-administered medications on file prior to visit.           Allergies:  Allergies   Allergen Reactions     Lisinopril Cough       PSFHx:   Social History     Socioeconomic History     Marital status:      Spouse name: Not on file     Number of children: Not on file     Years of education: Not on file     Highest education level: Not on file   Occupational History     Not on file   Social Needs     Financial resource strain: Not on file     Food insecurity:     Worry: Not on file     Inability: Not on file     Transportation needs:     Medical: Not on file     Non-medical: Not on file   Tobacco Use     Smoking status: Never Smoker     Smokeless tobacco: Never Used   Substance and Sexual Activity     Alcohol use: Yes     Drug use: Not on file     Sexual activity: Not on file   Lifestyle     Physical activity:     Days per week: Not on file     Minutes per session: Not on file     Stress: Not on file   Relationships     Social connections:     Talks on phone: Not on file     Gets together: Not on file     Attends [employer] service: Not on file     Active member of club or organization: Not on file     Attends meetings of clubs or organizations: Not on file     Relationship status: Not on file     Intimate partner violence:     Fear of current or ex partner: Not on file     Emotionally abused: Not on file     Physically abused: Not on file     Forced sexual activity: Not on file   Other Topics Concern     Not on file   Social History Narrative     BJORN 1988    ANDREY 1995    [employer]    IT/                        Objective:   /80   Pulse 77   Ht 6' 4\" (1.93 m)   Wt (!) 286 lb (129.7 kg)   SpO2 96%   BMI 34.81 kg/m    Weight:   Wt Readings from Last 3 Encounters:   05/15/19 " (!) 286 lb (129.7 kg)   05/30/18 (!) 286 lb 0.6 oz (129.7 kg)   06/09/17 (!) 286 lb 4 oz (129.8 kg)     Wt Readings from Last 9 Encounters:   05/15/19 (!) 286 lb (129.7 kg)   05/30/18 (!) 286 lb 0.6 oz (129.7 kg)   06/09/17 (!) 286 lb 4 oz (129.8 kg)   05/31/17 (!) 289 lb (131.1 kg)   03/22/17 (!) 294 lb (133.4 kg)   02/16/17 (!) 294 lb (133.4 kg)   01/27/17 (!) 292 lb 0.6 oz (132.5 kg)   04/01/16 (!) 280 lb 0.6 oz (127 kg)   11/25/15 (!) 272 lb 1.3 oz (123.4 kg)       General-appears well, no acute distress.  Healthy man  Bright friendly conversant chart    Skin: Normal.  Face has some small 1 to 2 mm papular-like lesions.  This does not appear to be acneiform.  It is both his forehead and his cheeks.  Rest of his body is quite unremarkable.  Lymph Nodes: None palpable-including neck, axilla, inguinal, epitrochlear.  Head:  Normocephalic.    Eyes: Midline.  Equal size., full ROM.  External exams normal.  No icterus  Ears:  Normal pinnae, canals, and TM's.    Nose:  Patent, without deformity.    Throat:  Moist mucous membranes without lesions, erythema, or exudate.    Neck: No palpable masses, lymphadenopathy or tenderness.No thyromegaly or goiter.  No thyroid nodule.  Carotid Arteries:  No Bruit.  Carotid upstroke normal  Chest Wall: No deformity or pain elicited on compression.  Respiratory:  Normal respiratory effort.  Lungs are clear with good breath sounds.  No dullness.  No wheezing.  Heart: Regular rhythm.  Normal sounding S1, S2 without S3, S4, murmurs, rubs, or gallops.    Abdomen:  The abdomen was flat, soft and nontender without guarding rebound or masses.  There are normal bowel sounds.  There is no hepatosplenomegaly.  There is no palpable enlargement of the aorta.  Genitalia:  Circumcised male without penile or testicular lesions.  No hernia.  Gluteal fold has what appears to be old scar from pilonidal cyst.  No pilonidal cyst noted    Rectal/Prostate:  No external lesions.  Sphincter tone normal.  No  palpable rectal lesions.    Prostate 1/4 BPH without nodule, smooth, nontender without palpable lesions.    Extremities:  Full ROM without limitation, deformity or edema.    4+ pulses  Neurologic-intact- No focal deficit.  Speech clear.  Coordination normal.  Strength symmetric  Orthopedic-no arthropathy.

## 2021-05-30 VITALS — HEIGHT: 75 IN | BODY MASS INDEX: 35.93 KG/M2 | WEIGHT: 289 LBS

## 2021-05-30 VITALS — BODY MASS INDEX: 36.31 KG/M2 | HEIGHT: 75 IN | WEIGHT: 292.04 LBS

## 2021-05-30 VITALS — BODY MASS INDEX: 36.56 KG/M2 | HEIGHT: 75 IN | WEIGHT: 294 LBS

## 2021-05-31 VITALS — WEIGHT: 286.25 LBS | BODY MASS INDEX: 34.86 KG/M2 | HEIGHT: 76 IN

## 2021-06-01 VITALS — WEIGHT: 286.04 LBS | BODY MASS INDEX: 34.83 KG/M2 | HEIGHT: 76 IN

## 2021-06-02 ENCOUNTER — RECORDS - HEALTHEAST (OUTPATIENT)
Dept: ADMINISTRATIVE | Facility: CLINIC | Age: 56
End: 2021-06-02

## 2021-06-02 VITALS — HEIGHT: 76 IN | BODY MASS INDEX: 34.83 KG/M2 | WEIGHT: 286 LBS

## 2021-06-04 VITALS
OXYGEN SATURATION: 96 % | WEIGHT: 293.12 LBS | BODY MASS INDEX: 35.69 KG/M2 | HEIGHT: 76 IN | DIASTOLIC BLOOD PRESSURE: 84 MMHG | HEART RATE: 73 BPM | SYSTOLIC BLOOD PRESSURE: 118 MMHG

## 2021-06-05 VITALS
WEIGHT: 308 LBS | HEART RATE: 70 BPM | SYSTOLIC BLOOD PRESSURE: 128 MMHG | DIASTOLIC BLOOD PRESSURE: 83 MMHG | BODY MASS INDEX: 37.49 KG/M2

## 2021-06-05 NOTE — PROGRESS NOTES
OFFICE VISIT NOTE  Jose Peres   54 y.o. male            Assessment/Plan for  Jose Peres is a 54 y.o. male.  No Patient Care Coordination Note on file.       1. Hypertension, unspecified type  Good control with Dyazide.  Cough resolved-ACE cough  Still persistent headache.  Normal neurologic exam check MRI  - Basic Metabolic Panel    2. Tension headache  With history of migraine.  I am surprised that this is persistent.  Will check MRI.  Neuro unremarkable  - MR Brain With Without Contrast; Future    3. Chronic depression  Doing very well.  He has been on these chronically  Would wean slowly  150 mg daily x3 months then consider staying on the same or possibly lowering.  Would wean off very slowly.  Talked about recurrence rates  - buPROPion (WELLBUTRIN XL) 150 MG 24 hr tablet; Take 1 tablet (150 mg total) by mouth daily.  Dispense: 90 tablet; Refill: 1  - HM1(CBC and Differential)  - HM1 (CBC with Diff)          Plan:  Lab  MR  Decrease dose Wellbutrin 150 mg XR daily  RTC 3-month    There are no Patient Instructions on file for this visit.    Diagnoses and all orders for this visit:    Hypertension, unspecified type  -     Basic Metabolic Panel    Tension headache  -     MR Brain With Without Contrast; Future; Expected date: 01/31/2020    Chronic depression  -     buPROPion (WELLBUTRIN XL) 150 MG 24 hr tablet; Take 1 tablet (150 mg total) by mouth daily.  Dispense: 90 tablet; Refill: 1  -     HM1(CBC and Differential)  -     HM1 (CBC with Diff)        Medications after visit  Current Outpatient Medications   Medication Sig Dispense Refill     ascorbic acid, vitamin C, (ASCORBIC ACID WITH TRACIE HIPS) 500 MG tablet Take 500 mg by mouth daily.       aspirin 81 MG EC tablet Take 81 mg by mouth daily.       buPROPion (WELLBUTRIN SR) 150 MG 12 hr tablet TAKE 1 TABLET TWICE A DAY 10 tablet 3     calcium-vitamin D 500 mg(1,250mg) -200 unit per tablet Take 1 tablet by mouth daily.       SUMAtriptan (IMITREX) 100 MG  tablet TAKE 1 TABLET (100 MG TOTAL) BY MOUTH EVERY 2 (TWO) HOURS AS NEEDED FOR MIGRAINE. 30 tablet 1     triamterene-hydrochlorothiazide (DYAZIDE) 37.5-25 mg per capsule Take 1 capsule by mouth daily. For hypertension 30 capsule 11     buPROPion (WELLBUTRIN XL) 150 MG 24 hr tablet Take 1 tablet (150 mg total) by mouth daily. 90 tablet 1     No current facility-administered medications for this visit.                       Lukas Vicente MD  Internal medicine  [facility]  [phone]  [email or web address]    Much or all of the text in this note was generated through the use of Dragon Dictate voice-to-text software. Errors in spelling or words which seem out of context are unintentional.   Sound alike errors, in particular, may have escaped editing.                 Subjective:   Chief Complaint:  Hypertension (Spiked at home recently. ); Medication Refill; Immunizations (Shingrix); and Medication Questions (Discuss bupropion)    Had a cough  Went off lisinopril  Cough resolved  Blood pressure was high  Started on Dyazide a month ago  Blood pressures now back to normal  Insert hypertension    Had significant headache in December.  Did not have his sumatriptan.  At full established headache.  Has had 1 since with sumatriptan resolving the issue.  Does have a low-grade headache however.  No other neurological type symptomatology.  His balance is been okay.    He is feeling well  He does not have the level of mental sharpness he is used to having.  He is concerned.  He wonders if it is the Wellbutrin.  He has a long history of depression.  He is in a good place currently.    Review of Systems:     Extensive 10-point review of systems was performed. Please see the HPI for problem specific pertinent review of systems.     Patient does note his appetite is good.  His bowels are regular    Otherwise, the following systems are noncontributory including constitutional, eyes, ears, nose and  "throat, cardiovascular, respiratory, gastrointestinal, genitourinary, musculoskeletal,neurological, skin and/or breast, endocrine, hematologic/lymph, allergic/immunologic and psychiatric.              Medications:  Current Outpatient Medications on File Prior to Visit   Medication Sig     ascorbic acid, vitamin C, (ASCORBIC ACID WITH TRACIE HIPS) 500 MG tablet Take 500 mg by mouth daily.     aspirin 81 MG EC tablet Take 81 mg by mouth daily.     buPROPion (WELLBUTRIN SR) 150 MG 12 hr tablet TAKE 1 TABLET TWICE A DAY     calcium-vitamin D 500 mg(1,250mg) -200 unit per tablet Take 1 tablet by mouth daily.     SUMAtriptan (IMITREX) 100 MG tablet TAKE 1 TABLET (100 MG TOTAL) BY MOUTH EVERY 2 (TWO) HOURS AS NEEDED FOR MIGRAINE.     triamterene-hydrochlorothiazide (DYAZIDE) 37.5-25 mg per capsule Take 1 capsule by mouth daily. For hypertension     [DISCONTINUED] fluticasone (FLONASE) 50 mcg/actuation nasal spray 1 spray into each nostril daily. (Patient taking differently: 1 spray into each nostril daily as needed. )     [DISCONTINUED] multivitamin therapeutic (THERAGRAN) tablet Take 1 tablet by mouth daily.     [DISCONTINUED] OMEGA-3-DHA-EPA-TUNA OIL ORAL Take by mouth daily.     No current facility-administered medications on file prior to visit.             Allergies:  Allergies   Allergen Reactions     Lisinopril Cough       PSFHx: Tobacco Status:  He  reports that he has never smoked. He has never used smokeless tobacco.   Alcohol Status:    Social History     Substance and Sexual Activity   Alcohol Use Yes       reports that he has never smoked. He has never used smokeless tobacco. He reports current alcohol use. No history on file for drug.    Objective:    /84   Pulse 73   Ht 6' 4\" (1.93 m)   Wt (!) 293 lb 1.9 oz (133 kg)   SpO2 96%   BMI 35.68 kg/m    Weight:   Wt Readings from Last 3 Encounters:   01/31/20 (!) 293 lb 1.9 oz (133 kg)   05/15/19 (!) 286 lb (129.7 kg)   05/30/18 (!) 286 lb 0.6 oz (129.7 " kg)     BP Readings from Last 10 Encounters:   01/31/20 118/84   05/15/19 122/80   05/30/18 124/86   06/09/17 114/76   01/27/17 104/72   04/01/16 102/68   11/25/15 96/64         General-appears well, no acute distress.  Extremely pleasant  Neck normal  Lungs clear  No murmur  No edema    Face symmetric  Speech normal  No nystagmus  Normal smile  Normal gait  No arm drift  Strength symmetric      Review of clinical lab tests  Lab Results   Component Value Date    WBC 11.1 (H) 05/15/2019    HGB 15.8 05/15/2019    HCT 48.5 05/15/2019     05/15/2019    CHOL 141 05/15/2019    TRIG 109 05/15/2019    HDL 47 05/15/2019    ALT 22 05/15/2019    AST 18 05/15/2019     05/15/2019    K 4.4 05/15/2019     05/15/2019    CREATININE 1.11 05/15/2019    BUN 13 05/15/2019    CO2 25 05/15/2019    TSH 1.03 05/15/2019    PSA 0.3 05/15/2019    INR 1.01 10/06/2010       Glucose   Date/Time Value Ref Range Status   05/15/2019 03:29 PM 95 70 - 125 mg/dL Final   05/30/2018 03:27 PM 98 70 - 125 mg/dL Final   06/09/2017 10:43 AM 96 70 - 125 mg/dL Final   04/01/2016 02:18 PM 88 70 - 125 mg/dL Final   11/25/2015 03:31 PM 93 70 - 125 mg/dL Final   12/20/2012 02:10 PM 90 70 - 125 mg/dL Final     Comment:          Fasting Glucose reference range is 70-99 mg/dL per       American Diabetes Association (ADA) guidelines.   10/06/2010 02:51  70 - 125 mg/dL Final     Comment:          Fasting Glucose reference range is 70-99 mg/dL per       American Diabetes Association (ADA) guidelines.     No results found for this or any previous visit (from the past 24 hour(s)).    RADIOLOGY: No results found.    Review of recent consultation-

## 2021-06-05 NOTE — TELEPHONE ENCOUNTER
Has not been felling good.  /100.  Highest it has been.    HA and pain in neck since before the holidays  Confusion in general and fatigue.    Kevin shoulder or chest pain. Drinking water and traking it past few weeks.    Pain rated now as not constant and at worse it makes him vomited ( last vomited Thankgiving).    Used to be on lisinopril.    Patient requesting to see pcp or should he restart the lisinopril he has at home till he can be seen.    Prefers phone call update.    Ok to leave detailed message    Julia Delgado RN [facility] Triage Nurse Advisor    Reason for Disposition    Patient wants to be seen    Protocols used: HIGH BLOOD PRESSURE-A-OH

## 2021-06-05 NOTE — TELEPHONE ENCOUNTER
Who is calling:  [employer] [facility]  Reason for Call:  Calling to inform PCP that the MRI for the Brain with and without contrast has been Approved,   Date of last appointment with primary care: 1/31/20  Okay to leave a detailed message: Yes

## 2021-06-06 ENCOUNTER — HEALTH MAINTENANCE LETTER (OUTPATIENT)
Age: 56
End: 2021-06-06

## 2021-06-06 NOTE — TELEPHONE ENCOUNTER
FYI - Status Update  Who is Calling: [facility] fax  Update: New pharmacy  Okay to leave a detailed message?:  No

## 2021-06-06 NOTE — TELEPHONE ENCOUNTER
Refill Approved    Rx renewed per Medication Renewal Policy. Medication was last renewed on 1/8/20. (New pharmacy).    Liliane Elizondo, [facility] Triage/Med Refill 2/17/2020     Requested Prescriptions   Pending Prescriptions Disp Refills     triamterene-hydrochlorothiazide (DYAZIDE) 37.5-25 mg per capsule 30 capsule 11     Sig: Take 1 capsule by mouth daily. For hypertension       Diuretics/Combination Diuretics Refill Protocol  Passed - 2/14/2020  3:38 PM        Passed - Visit with PCP or prescribing provider visit in past 12 months     Last office visit with prescriber/PCP: 1/31/2020 Lukas Vicente MD OR same dept: 1/31/2020 Lukas Vicente MD OR same specialty: 1/31/2020 Lukas Vicente MD  Last physical: 5/15/2019 Last MTM visit: Visit date not found   Next visit within 3 mo: Visit date not found  Next physical within 3 mo: Visit date not found  Prescriber OR PCP: Lukas Vicente MD  Last diagnosis associated with med order: 1. Hypertension, unspecified type  - triamterene-hydrochlorothiazide (DYAZIDE) 37.5-25 mg per capsule; Take 1 capsule by mouth daily. For hypertension  Dispense: 30 capsule; Refill: 11    If protocol passes may refill for 12 months if within 3 months of last provider visit (or a total of 15 months).             Passed - Serum Potassium in past 12 months      Lab Results   Component Value Date    Potassium 4.4 01/31/2020             Passed - Serum Sodium in past 12 months      Lab Results   Component Value Date    Sodium 140 01/31/2020             Passed - Blood pressure on file in past 12 months     BP Readings from Last 1 Encounters:   01/31/20 118/84             Passed - Serum Creatinine in past 12 months      Creatinine   Date Value Ref Range Status   01/31/2020 1.12 0.70 - 1.30 mg/dL Final

## 2021-06-06 NOTE — TELEPHONE ENCOUNTER
Refill Approved    Rx renewed per Medication Renewal Policy. Medication was last renewed on 6/11/19.    Soumya Sam, [facility] Connection Triage/Med Refill 2/19/2020     Requested Prescriptions   Pending Prescriptions Disp Refills     SUMAtriptan (IMITREX) 100 MG tablet 30 tablet 1     Sig: TAKE 1 TABLET (100 MG TOTAL) BY MOUTH EVERY 2 (TWO) HOURS AS NEEDED FOR MIGRAINE.       Triptans Refill Protocol Passed - 2/17/2020  9:20 AM        Passed - PCP or prescribing provider visit in past 12 months       Last office visit with prescriber/PCP: 1/31/2020 Lukas Vicente MD OR same dept: 1/31/2020 Lukas Vicente MD OR same specialty: 1/31/2020 Lukas Vicente MD  Last physical: 5/15/2019 Last MTM visit: Visit date not found   Next visit within 3 mo: Visit date not found  Next physical within 3 mo: Visit date not found  Prescriber OR PCP: Lukas Vicente MD  Last diagnosis associated with med order: 1. Vascular headache  - SUMAtriptan (IMITREX) 100 MG tablet; TAKE 1 TABLET (100 MG TOTAL) BY MOUTH EVERY 2 (TWO) HOURS AS NEEDED FOR MIGRAINE.  Dispense: 30 tablet; Refill: 1    If protocol passes may refill for 12 months if within 3 months of last provider visit (or a total of 15 months).

## 2021-06-06 NOTE — TELEPHONE ENCOUNTER
Refill Request  Did you contact pharmacy: Fax from pharmacy  Medication name:   Requested Prescriptions     Pending Prescriptions Disp Refills     SUMAtriptan (IMITREX) 100 MG tablet 30 tablet 1     Sig: TAKE 1 TABLET (100 MG TOTAL) BY MOUTH EVERY 2 (TWO) HOURS AS NEEDED FOR MIGRAINE.     Who prescribed the medication: Lukas Vicente MD  Requested Pharmacy: [facility]  Is patient out of medication: Unsure  Patient notified refills processed in 3 business days:  NA  Okay to leave a detailed message: yes

## 2021-06-08 NOTE — PROGRESS NOTES
Dear Dr. Lukas Vicente Md  [address] [place] [id number]    Thank you for the opportunity to participate in the care of  Jose Peres.    He is a 51 y.o. y/o who comes to the clinic because he is not sleeping well and he is concerned about the possibility of having sleep apnea.     The patient mentions that he is having frequent awakenings in the middle of the night. He wakes up with the sensation that he is not breathing well and he becomes very anxious. He started having this problem 2 to 3 years ago and it happens 2 to 3 nights per week. Some nights he has dreams that he is being chocked.      He denies difficulties falling asleep. He goes to bed around 10 to 11 pm and it takes him less than 5 minutes to fall asleep. He may fall asleep while watching TV on some nights.     His wife noticed that he is snoring at night. The snoring can be loud at nights. The snoring used to be only supine related but is happening now on any position.     The patient reports that he hit himself on the face while he was sleeping recently. This was a single episode but he does report vivid dreams on regular basis. He has been told that he can scream at night. The screaming tends to happen while he is falling asleep.     He used to sleepwalk while he was child and this resolved in his early twenties.     He noticed that his left hand shakes when he hold a cup. This problem started 5 to 7 years ago and tends to happen on his left hand more than his right hand (patient is right-handed). He has noticed that he is using the armrests more frequently to get up. He thinks that this problem could be related to the design of some pieces of furniture at home. No constipation.    Epworths Sleepiness Scale 2/16/2017   Sitting and reading 3   Watching TV 2   Sitting, inactive in a public place (e.g. a theatre or a meeting) 0   As a passenger in a car for an hour without a break 1   Lying down to rest in the afternoon when  circumstances permit 3   Sitting and talking to someone 0   Sitting quietly after a lunch without alcohol 0   In a car, while stopped for a few minutes in traffic 1   Total score 10   STOP BANG 2/16/2017   Do you snore loudly (louder than talking or loud enough to be heard through closed doors)? 1   Do you often feel tired, fatigued, or sleepy during daytime? 1   Has anyone observed you stop breathing in your sleep? 0   Do you have or are you being treated for high blood pressure? 1   BMI more than 35 kg/m2 1   Age over 50 years old? 0   Neck circumference greater than 16 inches? 1   Gender male? 1   Total Score 6   Rooming 2/16/2017   Usual bedtime 10-11p   Sleep Latency quickly   Awakenings 4-5   Wake Up Time 630a   Weekends 6-830a   Energy Drinks 0   Coffee y   Cola 2cans   Difficulty falling asleep No   Difficulty staying asleep Yes   Excessive daytime tiredness Yes   Excessive daytime sleepiness Yes   Dozing off while driving Yes   Shift Worker No   Sleep Walking? Yes   Sleep Talking? Yes   Kicking or punching? Yes   Restless legs symptoms No       Past Medical History  Past Medical History:   Diagnosis Date     Depression     HOSPITALIZATION 1996     HTN (hypertension)      Migraines      Nephrolithiasis 2008     Obesity (BMI 30-39.9)     BMI 36.8     OCD (obsessive compulsive disorder)         Past Surgical History  Past Surgical History:   Procedure Laterality Date     KNEE ARTHROSCOPY Bilateral      PILONIDAL ABSCESS          Meds  Current Outpatient Prescriptions   Medication Sig Dispense Refill     aspirin 81 MG EC tablet Take 81 mg by mouth daily.       buPROPion (WELLBUTRIN SR) 150 MG 12 hr tablet Take 1 tablet (150 mg total) by mouth 2 (two) times a day. 180 tablet 3     cholecalciferol, vitamin D3, 1,000 unit tablet Take 1,000 Units by mouth daily.       multivitamin therapeutic (THERAGRAN) tablet Take 1 tablet by mouth daily.       OMEGA-3-DHA-EPA-TUNA OIL ORAL Take by mouth daily.       SUMAtriptan  "(IMITREX) 100 MG tablet TAKE 1 TABLET (100 MG TOTAL) BY MOUTH EVERY 2 (TWO) HOURS AS NEEDED FOR MIGRAINE. 10 tablet 2     UNABLE TO FIND daily. Med Name: Catalyn (natural supplement)       No current facility-administered medications for this visit.         Allergies  Review of patient's allergies indicates no known allergies.     Social History  Social History     Social History     Marital status:      Spouse name: N/A     Number of children: N/A     Years of education: N/A     Occupational History     Not on file.     Social History Main Topics     Smoking status: Never Smoker     Smokeless tobacco: Not on file     Alcohol use Yes     Drug use: Not on file     Sexual activity: Not on file     Other Topics Concern     Not on file       Family History  Family History   Problem Relation Age of Onset     Hypertension Mother      Hypertension Father      Glaucoma Father      1944     Heart disease  40     FAMILY           Review of Systems:  Constitutional: Negative except as noted in HPI.   Eyes: Negative except as noted in HPI.   ENT: Negative except as noted in HPI.   Cardiovascular: Negative except as noted in HPI.   Respiratory: Negative except as noted in HPI.   Gastrointestinal: Negative except as noted in HPI.   Genitourinary: Negative except as noted in HPI.   Musculoskeletal: Negative except as noted in HPI.   Integumentary: Negative except as noted in HPI.   Neurological: Negative except as noted in HPI.   Psychiatric: Negative except as noted in HPI.   Endocrine: Negative except as noted in HPI.   Hematologic/Lymphatic: Negative except as noted in HPI.      Physical Exam:  Visit Vitals     Pulse 80     Ht 6' 3\" (1.905 m)     Wt (!) 294 lb (133.4 kg)     BMI 36.75 kg/m2     BMI:Body mass index is 36.75 kg/(m^2).   GEN: NAD, obese  Head: Normocephalic.  EYES: PERRLA, EOMI  ENT: Oropharynx is clear, mallampatti class IV airway. Uvula is edematous  Nasal mucosa is pink  Neck : Thyroid is not " palpable  CV: Regular rate and rhythm, S1 & S2 are normal. No murmurs  LUNGS: clear to auscultation bilaterally, no wheezes  ABDOMEN: positive bowel sounds, soft, no rebound or guarding  MUSCULOSKELETAL: no clubbing, cyanosis or edema  SKIN: warm, dry, no rashes  Neurological: Alert, oriented to time, place, and person.  No tremor  Unable to assess cog-wheeling  No bradykinesia.   Psych: normal mood, normal affect        Labs/Studies:     Lab Results   Component Value Date    WBC 6.8 01/27/2017    HGB 15.3 01/27/2017    HCT 46.2 01/27/2017    MCV 93 01/27/2017     01/27/2017         Chemistry        Component Value Date/Time     04/01/2016 1418    K 3.9 04/01/2016 1418     04/01/2016 1418    CO2 23 04/01/2016 1418    BUN 13 04/01/2016 1418    CREATININE 1.11 04/01/2016 1418    GLU 88 04/01/2016 1418        Component Value Date/Time    CALCIUM 9.2 04/01/2016 1418    ALKPHOS 58 04/01/2016 1418    AST 20 04/01/2016 1418    ALT 21 04/01/2016 1418    BILITOT 0.9 04/01/2016 1418          Lab Results   Component Value Date    TSH 0.80 04/01/2016         Assessment and Plan:  In summary Jose Perse is a 51 y.o. year old male here for consultation.    1. Snoring/ risk for sleep apnea/ daytime sleepiness.   Jose Peres has high risk for obstructive sleep apnea based on the results of his STOP BANG questionnaire, daytime sleepiness, and crowded airway.  We had an extensive conversation to review the entity of sleep apnea and differences between snoring and sleep apnea.   We reviewed the risks associated with sleep apnea, including increased cardiovascular risk and overall death. We talked about treatment options in general.  Recommend getting an overnight polysomnography to split per protocol.  The patient should return to the clinic to discuss results and treatment option in a patient-centered approach.    2. Parasomnia  This could be an isolated episode or may be a more serious problem.  The patient  has some symptoms that could be concerning for a movement disorder but the severity is very mild  We will assess during the PSG.       Patient verbalized understanding of these issues, agrees with the plan and all questions were answered today. Patient was given an opportuntity to voice any other symptoms or concerns not listed above. Patient did not have any other symptoms or concerns.      Patient instructed to stop at  to schedule appointment before leaving today.     MD SUDHAKAR Martinez Board Certified in Internal Medicine and Sleep Medicine  [facility].

## 2021-06-09 NOTE — PROGRESS NOTES
Order for Durable Medical Equipment was processed and equipment ordered.   DME provider: Demetrio  Date Faxed: 3/22/2017  Ordering Provider:   Equipment ordered: CPAP/SUPPLIES

## 2021-06-09 NOTE — PROGRESS NOTES
Dear Dr. Lukas Vicente MD  [address] [place] [id number],    Thank you for the opportunity to participate in the care of Jose Peres.     He is a 51 y.o. y/o male patient who comes to the sleep medicine clinic for follow up.    We had an extensive conversation to review the results of his sleep study.    The overnight polysomnography was completed with a digital sleep system using the international 10-20 electrode placement for recording EEG, EOG, EMG from chin, ECG, respiratory effort, oximetry, body position, airflow, nasal pressure, snoring sound, pulse rate and limb movement channels.    The study was completed as a split night study.    1. During the diagnostic portion of the study respiratory monitoring showed moderate obstructive sleep apnea/hypopnea (AHI=17.8).  2. A trial of nasal CPAP was initiated given the severity of sleep-disordered breathing and CPAP of 9 cwp was effective at eliminating obstructive events.   3. No activity consistent with parasomnia was observed during the test. REM atonia was intact.    We reviewed the oxygen saturation graph as well as the result tables from the report.    Past Medical History:   Diagnosis Date     Depression     HOSPITALIZATION 1996     HTN (hypertension)      Migraines      Nephrolithiasis 2008     Obesity (BMI 30-39.9)     BMI 36.8     OCD (obsessive compulsive disorder)        Past Surgical History:   Procedure Laterality Date     KNEE ARTHROSCOPY Bilateral      PILONIDAL ABSCESS         Social History     Social History     Marital status:      Spouse name: N/A     Number of children: N/A     Years of education: N/A     Occupational History     Not on file.     Social History Main Topics     Smoking status: Never Smoker     Smokeless tobacco: Not on file     Alcohol use Yes     Drug use: Not on file     Sexual activity: Not on file     Other Topics Concern     Not on file     Social History Narrative     BJORN 1988     "ANDREY 1995    [employer]    [place]/                Review of Systems:  General: No weight gain, no weight loss  Eyes: No vision changes  ENT: No hearing changes  Cardio: No chest pain, no nocturnal dyspnea  Respiratory: No shortness of breath, no cough  Gastrointestinal: No diarrhea, no constipation  Genitourinary: No excessive urination  Tegumentary: No rashes  Neurological: No seizures, no loss of consciousness  Endo: No heat or cold intolerance.    Current Outpatient Prescriptions   Medication Sig Dispense Refill     aspirin 81 MG EC tablet Take 81 mg by mouth daily.       buPROPion (WELLBUTRIN SR) 150 MG 12 hr tablet Take 1 tablet (150 mg total) by mouth 2 (two) times a day. 180 tablet 3     cholecalciferol, vitamin D3, 1,000 unit tablet Take 1,000 Units by mouth daily.       multivitamin therapeutic (THERAGRAN) tablet Take 1 tablet by mouth daily.       OMEGA-3-DHA-EPA-TUNA OIL ORAL Take by mouth daily.       SUMAtriptan (IMITREX) 100 MG tablet TAKE 1 TABLET (100 MG TOTAL) BY MOUTH EVERY 2 (TWO) HOURS AS NEEDED FOR MIGRAINE. 10 tablet 2     UNABLE TO FIND daily. Med Name: Catalyn (natural supplement)       No current facility-administered medications for this visit.        No Known Allergies    Physical Exam:  Visit Vitals     Pulse 80     Ht 6' 3\" (1.905 m)     Wt (!) 294 lb (133.4 kg)     BMI 36.75 kg/m2     BMI:Body mass index is 36.75 kg/(m^2).   GEN: NAD, obese  Neurological: Alert, oriented to time, place, and person.  Psych: normal mood, normal affect     Labs/Studies:  - We reviewed the results of the overnight PSG as described on the HPI.     Lab Results   Component Value Date    WBC 6.8 01/27/2017    HGB 15.3 01/27/2017    HCT 46.2 01/27/2017    MCV 93 01/27/2017     01/27/2017         Chemistry        Component Value Date/Time     04/01/2016 1418    K 3.9 04/01/2016 1418     04/01/2016 1418    CO2 23 04/01/2016 1418    BUN " 13 04/01/2016 1418    CREATININE 1.11 04/01/2016 1418    GLU 88 04/01/2016 1418        Component Value Date/Time    CALCIUM 9.2 04/01/2016 1418    ALKPHOS 58 04/01/2016 1418    AST 20 04/01/2016 1418    ALT 21 04/01/2016 1418    BILITOT 0.9 04/01/2016 1418                Assessment and Plan:  In summary Jose Peres is a 51 y.o. year old male here for follow up.    1. Obstructive Sleep Apnea  We had an extensive conversation to review the results of his sleep study and to  him on the importance of treating sleep apnea.   We reviewed treatment options including CPAP therapy, a MAD, surgery, as well as the Inspire device.  We will order a CPAP device with a pressure of 9 cwp.  He will start using the device as soon as he receives it with the intention to use if for the entire night.  We discussed some tips to increase PAP tolerance as well as the normal curve of adaptation. CPAP is going to provide improved respiratory function during the night but it can cause some sleep disruption that tends to improve with continuous usage.  He should return to the clinic in 10 weeks to review compliance and efficacy monitoring.  We talked about insurance requirements and I encourage the patient to learn the specific details of his health insurance plan regarding durable medical equipment.    Patient verbalized understanding of these issues, agrees with the plan and all questions were answered today. Patient was given an opportuntity to voice any other symptoms or concerns not listed above. Patient did not have any other symptoms or concerns.      Patient instructed to stop at  to schedule appointment before leaving today.    Jonn Garcia MD  [facility] Board Certified in Internal Medicine and Sleep Medicine  [facility].    We spent a total of 25 minutes during this visit and more than 50% of the time was used to  the patient about sleep apnea.

## 2021-06-09 NOTE — PROGRESS NOTES
Overnight polysomnography was reviewed.   We will wait until his upcoming appointment to discuss results and treatment options.

## 2021-06-11 NOTE — PROGRESS NOTES
HISTORY/PHYSICAL EXAM  Jose Peres   51 y.o. male  Is here for a physical healthcare maintenance examination        Assessment/Plan for  Jose Peres is a 51 y.o. male.       1.  Healthy man.  2.  Obesity-good job with weight loss  3.  Cough-resolved-ACE inhibitor cough  4.  Normotensive  5.  Mild obesity- good job with weight loss  6.  Depression doing well on medication      Plan:  1.  Routine lab  2.  Routine ECG  3.  Annual exam   4.  Continue current medication    The following high BMI interventions were performed this visit: encouragement to exercise and weight monitoring      In addition to the physical examination.  This provider spent greater than 15 min. face-to-face time with the patient and/or his family.  We discussed blood pressure, weight, depression, hypertension.  More than half this time was spent in counseling and or coordination of care with other providers or agencies which were consistent with the nature of this patient's problems which are listed and described in the assessment and plan.  There are no diagnoses linked to this encounter.    Lukas Vicente MD  Internal medicine  [facility]  [phone]  [email or web address]      This is an electronically verified report by Lukas Vicente M.D.  (Note created with Dragon voice recognition and unintended spelling errors and word substitutions may occur)        SUBJECTIVE/HPI    Chief Complaint:  Annual Exam (pt is fasting)  Doing well  Likes cpap for mod garcía    He was diagnosed with moderate GARCÍA (AHI=17.8)  and has been using CPAP of 9 cwp.     His symptoms are improved since he started using CPAP. He is more refreshed in the morning. He denies any PAP intolerance. He is using the device every nght and tolerates the pressure well.     Off bp meds  No cough       Review of Systems:   Extensive 14-point comprehensive review of systems was performed.   Patient reports  1. Weight loss-more nutritional   2.  On  antidepressant chronic doing well  3.  Vascular headache no issues    Otherwise, the following systems are negative including constitutional, eyes, ears, nose and throat, cardiovascular, respiratory, gastrointestinal, genitourinary, musculoskeletal,neurological, skin and/or breast, endocrine, hematologic/lymph, allergic/immunologic and psychiatric.  Surgical History  Past Surgical History:   Procedure Laterality Date     KNEE ARTHROSCOPY Bilateral      PILONIDAL ABSCESS         Medical History     Past Medical History:   Diagnosis Date     Depression      HTN (hypertension)      Migraines      Nephrolithiasis 2008     Obesity (BMI 30-39.9)      OCD (obsessive compulsive disorder)      Patient Active Problem List    Diagnosis Date Noted     Chronic cough 01/27/2017     Hypertension 01/27/2017     Chronic depression 11/25/2015     Obesity (BMI 30.0-34.9) 11/25/2015        Family History  Family History   Problem Relation Age of Onset     Hypertension Mother      Hypertension Father      Glaucoma Father      1942     Heart disease  40     FAMILY       Medications:  Current Outpatient Prescriptions   Medication Sig Dispense Refill     aspirin 81 MG EC tablet Take 81 mg by mouth daily.       buPROPion (WELLBUTRIN SR) 150 MG 12 hr tablet Take 1 tablet (150 mg total) by mouth 2 (two) times a day. 180 tablet 3     cholecalciferol, vitamin D3, 1,000 unit tablet Take 1,000 Units by mouth daily.       fluticasone (FLONASE) 50 mcg/actuation nasal spray 1 spray into each nostril daily. 16 g 12     multivitamin therapeutic (THERAGRAN) tablet Take 1 tablet by mouth daily.       OMEGA-3-DHA-EPA-TUNA OIL ORAL Take by mouth daily.       SUMAtriptan (IMITREX) 100 MG tablet TAKE 1 TABLET (100 MG TOTAL) BY MOUTH EVERY 2 (TWO) HOURS AS NEEDED FOR MIGRAINE. 10 tablet 2     UNABLE TO FIND daily. Med Name: Catalyn (natural supplement)       No current facility-administered medications for this visit.        Allergies:  No Known  "Allergies    PSFHx:   Social History     Social History     Marital status:      Spouse name: N/A     Number of children: N/A     Years of education: N/A     Occupational History     Not on file.     Social History Main Topics     Smoking status: Never Smoker     Smokeless tobacco: Not on file     Alcohol use Yes     Drug use: Not on file     Sexual activity: Not on file     Other Topics Concern     Not on file     Social History Narrative     BJORN 1988    ANDREY 1995    [employer]    IT/                        Objective:   /76  Pulse 64  Ht 6' 4.18\" (1.935 m)  Wt (!) 286 lb 4 oz (129.8 kg)  BMI 34.68 kg/m2  Weight:   Wt Readings from Last 3 Encounters:   06/09/17 (!) 286 lb 4 oz (129.8 kg)   05/31/17 (!) 289 lb (131.1 kg)   03/22/17 (!) 294 lb (133.4 kg)     Weight loss noted  General-appears well, no acute distress.  Skin: Normal. No rash or lesion  Lymph Nodes: None palpable-including neck, axilla, inguinal, epitrochlear.  Head:  Normocephalic.    Eyes: Midline.  Equal size., full ROM.  External exams normal.  No icterus  Ears:  Normal pinnae, canals, and TM's.    Nose:  Patent, without deformity.    Throat:  Moist mucous membranes without lesions, erythema, or exudate.    Neck: No palpable masses, lymphadenopathy or tenderness.No thyromegaly or goiter.  No thyroid nodule.  Carotid Arteries:  No Bruit.  Carotid upstroke normal  Chest Wall: No deformity or pain elicited on compression.  Respiratory:  Normal respiratory effort.  Lungs are clear with good breath sounds.  No dullness.  No wheezing.  Heart: Regular rhythm.  Normal sounding S1, S2 without S3, S4, murmurs, rubs, or gallops.    Abdomen:  The abdomen was flat, soft and nontender without guarding rebound or masses.  There are normal bowel sounds.  There is no hepatosplenomegaly.  There is no palpable enlargement of the aorta.  Genitalia:  Circumcised male without penile or " testicular lesions.  Mild bulge bilaterally.  Rectal/Prostate:  No external lesions.  Sphincter tone normal.  No palpable rectal lesions.    Prostate 1-2/4 BPH without nodule, smooth, nontender without palpable lesions.    Extremities:  Full ROM without limitation, deformity or edema.    4+ pulses  Neurologic-intact- No focal deficit.  Speech clear.  Coordination normal.  Strength symmetric  Orthopedic-no arthropathy.      Laboratory: Ordered  No results found for this or any previous visit (from the past 24 hour(s)).    RADIOLOGY: No results found.    Radiology:  XR CHEST PA AND LATERAL1/27/2017 8:40 AMINDICATION: CoughCOMPARISON: None.FINDINGS: Mildly tortuous or dilated ascending aorta. Chest otherwise normal. Lungs are clear.This report was electronically interpreted by: Dr. Brian Peña MD ON 01/27/2017 at   08:55    Electrocardiogram: Ordered

## 2021-06-11 NOTE — PROGRESS NOTES
Dear Dr. Lukas Vicente MD  [address] [place] [id number],    Thank you for the opportunity to participate in the care of Jose Peres.     He is a 51 y.o. y/o male patient who comes to the sleep medicine clinic for follow up.    He was diagnosed with moderate GARCÍA (AHI=17.8)  and has been using CPAP of 9 cwp.    His symptoms are improved since he started using CPAP. He is more refreshed in the morning. He denies any PAP intolerance. He is using the device every nght and tolerates the pressure well.     Residual AHI: 3.0  Leak: 7.6  Compliance: 100%    Mask Tolerance: excellent but he is using Afrin 3 to 4 times per week due to nasal congestion.  Skin irritation: no      Past Medical History:   Diagnosis Date     Depression     HOSPITALIZATION 1996     HTN (hypertension)      Migraines      Nephrolithiasis 2008     Obesity (BMI 30-39.9)     BMI 36.8     OCD (obsessive compulsive disorder)        Past Surgical History:   Procedure Laterality Date     KNEE ARTHROSCOPY Bilateral      PILONIDAL ABSCESS         Social History     Social History     Marital status:      Spouse name: N/A     Number of children: N/A     Years of education: N/A     Occupational History     Not on file.     Social History Main Topics     Smoking status: Never Smoker     Smokeless tobacco: Not on file     Alcohol use Yes     Drug use: Not on file     Sexual activity: Not on file     Other Topics Concern     Not on file     Social History Narrative     BJORN 1988    ANDREY 1995    [employer]    IT/                Review of Systems:  General: No weight gain, no weight loss  Eyes: No vision changes  ENT: No hearing changes  Cardio: No chest pain, no nocturnal dyspnea  Respiratory: No shortness of breath, no cough  Gastrointestinal: No diarrhea, no constipation  Genitourinary: No excessive urination  Tegumentary: No rashes  Neurological: No seizures,  "no loss of consciousness  Endo: No heat or cold intolerance.    Current Outpatient Prescriptions   Medication Sig Dispense Refill     aspirin 81 MG EC tablet Take 81 mg by mouth daily.       buPROPion (WELLBUTRIN SR) 150 MG 12 hr tablet Take 1 tablet (150 mg total) by mouth 2 (two) times a day. 180 tablet 3     cholecalciferol, vitamin D3, 1,000 unit tablet Take 1,000 Units by mouth daily.       multivitamin therapeutic (THERAGRAN) tablet Take 1 tablet by mouth daily.       OMEGA-3-DHA-EPA-TUNA OIL ORAL Take by mouth daily.       SUMAtriptan (IMITREX) 100 MG tablet TAKE 1 TABLET (100 MG TOTAL) BY MOUTH EVERY 2 (TWO) HOURS AS NEEDED FOR MIGRAINE. 10 tablet 2     UNABLE TO FIND daily. Med Name: Catalyn (natural supplement)       No current facility-administered medications for this visit.        No Known Allergies    Physical Exam:  Pulse 79  Ht 6' 3\" (1.905 m)  Wt (!) 289 lb (131.1 kg)  SpO2 96%  BMI 36.12 kg/m2  BMI:Body mass index is 36.12 kg/(m^2).   GEN: NAD, obese  Neurological: Alert, oriented to time, place, and person.  Psych: normal mood, normal affect     Labs/Studies:     I reviewed the efficacy and compliance report from his device. Data summarized on the HPI and the CPAP compliance flow sheet.     Lab Results   Component Value Date    WBC 6.8 01/27/2017    HGB 15.3 01/27/2017    HCT 46.2 01/27/2017    MCV 93 01/27/2017     01/27/2017         Chemistry        Component Value Date/Time     04/01/2016 1418    K 3.9 04/01/2016 1418     04/01/2016 1418    CO2 23 04/01/2016 1418    BUN 13 04/01/2016 1418    CREATININE 1.11 04/01/2016 1418    GLU 88 04/01/2016 1418        Component Value Date/Time    CALCIUM 9.2 04/01/2016 1418    ALKPHOS 58 04/01/2016 1418    AST 20 04/01/2016 1418    ALT 21 04/01/2016 1418    BILITOT 0.9 04/01/2016 1418              Assessment and Plan:  In summary Jose Peres is a 51 y.o. year old male who is here for follow up.    1. Obstructive Sleep " Apnea  Obstructive Sleep Apnea is well controlled with current CPAP settings.  Continue with P= 9 cwp.  He would benefit from using flonase instead of Afrin.  We counseled the patient on the importannce of using his CPAP device every night and the risks of not treating sleep apnea.      Patient verbalized understanding of these issues, agrees with the plan and all questions were answered today. Patient was given an opportuntity to voice any other symptoms or concerns not listed above. Patient did not have any other symptoms or concerns.      Patient told to return in 12 months. Patient instructed to stop at  to schedule appointment before leaving today.    MD SUDHAKAR Martinez Board Certified in Internal Medicine and Sleep Medicine  [facility].

## 2021-06-12 NOTE — TELEPHONE ENCOUNTER
COVID 19 Nurse Triage Plan/Patient Instructions    Please be aware that novel coronavirus (COVID-19) may be circulating in the community. If you develop symptoms such as fever, cough, or SOB or if you have concerns about the presence of another infection including coronavirus (COVID-19), please contact your health care provider or visit [email or web address].     Disposition/Instructions    ED Visit recommended. Follow protocol based instructions.      Bring Your Own Device:  Please also bring your smart device(s) (smart phones, tablets, laptops) and their charging cables for your personal use and to communicate with your care team during your visit.      Thank you for taking steps to prevent the spread of this virus.  o Limit your contact with others.  o Wear a simple mask to cover your cough.  o Wash your hands well and often.    Resources    [facility]: About COVID-19: [email or web address]    CDC: What to Do If You're Sick: www.cdc.gov/coronavirus/2019-ncov/about/steps-when-sick.html    CDC: Ending Home Isolation: www.cdc.gov/coronavirus/2019-ncov/hcp/disposition-in-home-patients.html     CDC: Caring for Someone: www.cdc.gov/coronavirus/2019-ncov/if-you-are-sick/care-for-someone.html     [facility]: Interim Guidance for Hospital Discharge to Home: www.health.[facility].mn.us/diseases/coronavirus/hcp/hospdischarge.pdf    [facility] clinical trials (COVID-19 research studies): [email or web address]-clinical-trials     Below are the COVID-19 hotlines at the [facility] of Health ([facility]). Interpreters are available.   o For health questions: Call 082-611-2266 or 1-429.926.2799 (7 a.m. to 7 p.m.)  o For questions about schools and childcare: Call 688-848-0483 or 1-767.231.3824 (7 a.m. to 7 p.m.)             RN triage  Call from pt   Pt states he had L back of thigh ' lottie horse' @ 0100 today --   Severe pain for < 1 minute --   Still feeling 'tender ' now   No redness or streaks - no  swelling -   No chest pain or pressure   No diff breathing   No fever     3 weeks ago - in the dark - pt fell into open manhole -- L leg into hole up to thigh/hip -- pain   After that developed very large bruise from buttock to knee -- mostly resolved now --     Per protocol = should go to ED -- check w/ PCP first   Please advise :  When and where do you want pt seen     OK to leave detailed message on pt phone     Yoly Gasca RN [facility] RN triage    Additional Information    Negative: Looks like a broken bone or dislocated joint (e.g., crooked or deformed)    Negative: Sounds like a life-threatening emergency to the triager    Negative: Chest pain    Negative: Difficulty breathing    Negative: Entire foot is cool or blue in comparison to other side    Negative: Unable to walk    Negative: Followed a hip injury    Negative: Followed a knee injury    Negative: Followed an ankle or foot injury    Negative: Back pain radiating (shooting) into leg(s)    Negative: Foot pain is the main symptom    Negative: Ankle pain is the main symptom    Negative: Knee pain is the main symptom    Negative: Leg swelling is the main symptom    Thigh or calf pain in only one leg and present > 1 hour    Protocols used: LEG PAIN-A-OH

## 2021-06-12 NOTE — TELEPHONE ENCOUNTER
RN cannot approve Refill Request    RN can NOT refill this medication No established PCP. Last office visit: 1/31/2020 Lukas Vicente MD Last Physical: 5/15/2019 Last MTM visit: Visit date not found Last visit same specialty: 1/31/2020 Lukas Vicente MD.  Next visit within 3 mo: Visit date not found  Next physical within 3 mo: Visit date not found      Jeanne Maldonado, [facility] Triage/Med Refill 10/9/2020    Requested Prescriptions   Pending Prescriptions Disp Refills     triamterene-hydrochlorothiazide (DYAZIDE) 37.5-25 mg per capsule [Pharmacy Med Name: TRIAMT-HCTZ 37.5-25MG CAPSULE] 90 capsule 3     Sig: TAKE 1 CAPSULE BY MOUTH  DAILY FOR HYPERTENSION       Diuretics/Combination Diuretics Refill Protocol  Passed - 10/6/2020  9:50 PM        Passed - Visit with PCP or prescribing provider visit in past 12 months     Last office visit with prescriber/PCP: 1/31/2020 Lukas Vicente MD OR same dept: 1/31/2020 Lukas Vicente MD OR same specialty: 1/31/2020 Lukas Vicente MD  Last physical: 5/15/2019 Last MTM visit: Visit date not found   Next visit within 3 mo: Visit date not found  Next physical within 3 mo: Visit date not found  Prescriber OR PCP: Lukas Vicente MD  Last diagnosis associated with med order: 1. Hypertension, unspecified type  - triamterene-hydrochlorothiazide (DYAZIDE) 37.5-25 mg per capsule [Pharmacy Med Name: TRIAMT-HCTZ 37.5-25MG CAPSULE]; Take 1 capsule by mouth daily. For hypertension  Dispense: 90 capsule; Refill: 3    If protocol passes may refill for 12 months if within 3 months of last provider visit (or a total of 15 months).             Passed - Serum Potassium in past 12 months      Lab Results   Component Value Date    Potassium 4.4 01/31/2020             Passed - Serum Sodium in past 12 months      Lab Results   Component Value Date    Sodium 140 01/31/2020             Passed - Blood pressure on file in past 12 months     BP Readings from Last 1 Encounters:   01/31/20  118/84             Passed - Serum Creatinine in past 12 months      Creatinine   Date Value Ref Range Status   01/31/2020 1.12 0.70 - 1.30 mg/dL Final

## 2021-06-12 NOTE — TELEPHONE ENCOUNTER
Spoke with the patient and relayed message below from Dr. Goss.  He verbalized understanding and had no further questions at this time.  Samara ROCA, EDITA/MG....................9:20 AM

## 2021-06-15 PROBLEM — I10 HYPERTENSION: Status: ACTIVE | Noted: 2017-01-27

## 2021-06-15 NOTE — TELEPHONE ENCOUNTER
RN cannot approve Refill Request    RN can NOT refill this medication PCP messaged that patient is overdue for Office Visit. Last office visit: 1/31/2020 Lukas Vicente MD Last Physical: 5/15/2019 Last MTM visit: Visit date not found Last visit same specialty: 1/31/2020 Lukas Vicente MD.  Next visit within 3 mo: Visit date not found  Next physical within 3 mo: Visit date not found      Maryjo Lopez, [facility] Triage/Med Refill 2/24/2021    Requested Prescriptions   Pending Prescriptions Disp Refills     SUMAtriptan (IMITREX) 100 MG tablet [Pharmacy Med Name: SUMATRIPTAN  100MG  TAB] 30 tablet 11     Sig: TAKE 1 TABLET BY MOUTH  EVERY 2 HOURS AS NEEDED FOR MIGRAINE(S)       Triptans Refill Protocol Failed - 2/24/2021 10:05 AM        Failed - PCP or prescribing provider visit in past 12 months       Last office visit with prescriber/PCP: 1/31/2020 Lukas Vicente MD OR same dept: Visit date not found OR same specialty: 1/31/2020 Lukas Vicente MD  Last physical: 5/15/2019 Last MTM visit: Visit date not found   Next visit within 3 mo: Visit date not found  Next physical within 3 mo: Visit date not found  Prescriber OR PCP: Lukas Vicente MD  Last diagnosis associated with med order: 1. Vascular headache  - SUMAtriptan (IMITREX) 100 MG tablet [Pharmacy Med Name: SUMATRIPTAN  100MG  TAB]; TAKE 1 TABLET BY MOUTH  EVERY 2 HOURS AS NEEDED FOR MIGRAINE(S)  Dispense: 30 tablet; Refill: 11    If protocol passes may refill for 12 months if within 3 months of last provider visit (or a total of 15 months).

## 2021-06-16 PROBLEM — G47.33 OSA (OBSTRUCTIVE SLEEP APNEA): Status: ACTIVE | Noted: 2017-06-09

## 2021-06-16 PROBLEM — E66.01 MORBID OBESITY (H): Status: ACTIVE | Noted: 2021-04-09

## 2021-06-16 NOTE — PROGRESS NOTES
Office Visit - Follow Up   Jose Peres   55 y.o. 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 Quincy Munoz.  Issues are as follows: Hypertension in the context of obesity and obstructive sleep apnea, blood pressure appears to be well controlled on triamterene hydrochlorothiazide, history of ACE inhibitor cough.  Anxiety with OCD tendencies, history of depression, but symptoms have been under decent control since stopping bupropion in May 2020.  Migraine/vascular headaches, for which sumatriptan and has worked as abortive therapy, although he does consume a lot of caffeine.  Obstructive sleep apnea, needs renewal of his CPAP supplies.  Obesity with body mass index of 37.5, I really would like to see him lose 25 pounds this year.  History of left knee meniscus surgery and lateral collateral ligament repair, he has implemented strength training to help stabilize his knee.  Status post bilateral inguinal repair surgery 2018.  Benign prostatic hyperplasia symptoms with nighttime urination about 2-3.  Due for age-appropriate screening colonoscopy, which is ordered for him.  He received a second shot of Pfizer COVID-19 vaccine April 9, 2021.    I have ordered fasting labs for him to draw on future morning, which would be a lipid panel, comprehensive metabolic panel, blood cell counts, A1c to screen for diabetes, TSH thyroid cascade, screening PSA, and vitamin D level.  Screening colonoscopy ordered.  Order entered for CPAP supplies.  Prescription renewal for sumatriptan.    Going issue by issue:    Hypertension in the context of obesity and obstructive sleep apnea,  blood pressure appears to be well controlled on triamterene hydrochlorothiazide, history of ACE inhibitor cough.  Today's in clinic blood pressure was pretty decent at 128/83.  He owns a home blood pressure machine, and I want him to keep using that.  Target is 120/80 measured at rest in seated position.  Losing weight will help reduce his dependence on blood pressure medication.  His kidney function historically has been normal, will recheck that on this round of testing.  I think triamterene hydrochlorothiazide is fine to continue.    Anxiety with OCD tendencies, history of depression, but symptoms have been under decent control since stopping bupropion in May 2020.  He had more significant depression symptoms in his 30s.  Nowadays he manages with cognitive behavioral therapy type of techniques.  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  He Once upon a time use Celexa.  He would rather not use medication.  I told him about the possibly of trying meditation and other mindfulness practices.  Alcohol consumption sounds modest, about a beer a day, although that is a source of calories.    Migraine/vascular headaches, for which sumatriptan and has worked as abortive therapy, although he does consume a lot of caffeine.  We will renew his prescription for sumatriptan.    Obstructive sleep apnea, needs renewal of his CPAP supplies.      Obesity with body mass index of 37.5, I really would like to see him lose 25 pounds this year.  I reminded him about the importance of eating slowly, controlling portion size, and identifying problem foods to curtail or eliminate.  He recently purchased a Nordic track exercise bike.  I wanted to get at least 30 minutes of vigorous physical activity every day.  That will also help quality of sleep and blood pressure.  I reminded him about the carbohydrate calorie content of beer.    History of left knee meniscus  surgery and lateral collateral ligament repair, he has implemented strength training to help stabilize his knee.      Status post bilateral inguinal repair surgery 2018.     Benign prostatic hyperplasia symptoms with nighttime urination about 2-3.  I told him we could consider medication to improve BPH symptoms.  If he wants to try medication, start with the alpha-blocker tamsulosin 0.4 mg once a day.  Another medication To consider is finasteride, which gradually shrink the prostate gland over the course of a year by blocking the action of testosterone on prostate gland tissue.  Tamsulosin and finasteride are often combined together.  We will check a screening PSA on this round of testing.    Due for age-appropriate screening colonoscopy, which is ordered for him.      He received a second shot of Pfizer COVID-19 vaccine April 9, 2021.    --------------------------------------------------------------------------------------------------------------------------  History of Present Illness  This 55 y.o. 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 Quincy Munoz.  Issues are as follows: Hypertension in the context of obesity and obstructive sleep apnea, blood pressure appears to be well controlled on triamterene hydrochlorothiazide, history of ACE inhibitor cough.  Anxiety with OCD tendencies, history of depression, but symptoms have been under decent control since stopping bupropion in May 2020.  Migraine/vascular headaches, for which sumatriptan and has worked as abortive therapy, although he does consume a lot of caffeine.  Obstructive sleep apnea, needs renewal of his CPAP supplies.  Obesity with body mass index of 37.5, I really would like to see him lose 25 pounds this year.  History of left knee meniscus surgery and lateral collateral ligament repair, he has implemented strength training to help stabilize his knee.   Status post bilateral inguinal repair surgery 2018.  Benign prostatic hyperplasia symptoms with nighttime urination about 2-3.  Due for age-appropriate screening colonoscopy, which is ordered for him.  He received a second shot of Pfizer COVID-19 vaccine April 9, 2021.    IT security executive for [employer]    Hypertension  Good control with  triamterene-hydrochlorothiazide (DYAZIDE) 37.5-25 mg per capsule  Cough resolved-ACE cough    BP Readings from Last 3 Encounters:   04/09/21 128/83   01/31/20 118/84   05/15/19 122/80     aspirin 81 MG EC tablet      Lab Results   Component Value Date    CREATININE 1.12 01/31/2020    BUN 14 01/31/2020     01/31/2020    K 4.4 01/31/2020     01/31/2020    CO2 27 01/31/2020     Lab Results   Component Value Date    CHOL 141 05/15/2019    CHOL 137 06/09/2017    CHOL 144 04/01/2016     Lab Results   Component Value Date    HDL 47 05/15/2019    HDL 37 (L) 06/09/2017    HDL 41 04/01/2016     Lab Results   Component Value Date    LDLCALC 72 05/15/2019    LDLCALC 84 06/09/2017    LDLCALC 81 04/01/2016     Lab Results   Component Value Date    TRIG 109 05/15/2019    TRIG 81 06/09/2017    TRIG 108 04/01/2016     No components found for: CHOLHDL      Anxiety, OCD traits, history depression  Stopped bupropion as of May 2020  History of depression I his 30's, did therapy  Does CBT  Stressful year: parent got COVID, stress at work    150 mg daily x3 months then consider staying on the same or possibly lowering.  Would wean off very slowly.      Talked about recurrence rates  - buPROPion (WELLBUTRIN XL) 150 MG 24 hr tablet; Take 1 tablet (150 mg total) by mouth daily.   Past Celexa    Alcohol: beers, 1 day    Vascular headache  Sumatriptan needs renewal  Frequency might be every week for 3 weeks, then 3 mots in remission  Sensitive to hydration  Caffeine: 2 large mugs a day    Obstructive sleep apnea, might miss 10-20% of night  Needs supplies    Obesity  Gained in the tashia  Less  exercise, careless eating    Wt Readings from Last 3 Encounters:   04/09/21 (!) 308 lb (139.7 kg)   01/31/20 (!) 293 lb 1.9 oz (133 kg)   05/15/19 (!) 286 lb (129.7 kg)     Skin rash: acne  Gilson Fowler    Left knee meniscus surgery, Left LCL high school sprts injury    S/p bilateral hernia repairs 2018    Mild BPH-- frequency 2-3 times a night  Lab Results   Component Value Date    PSA 0.3 05/15/2019    PSA 0.8 06/09/2017    PSA 0.4 04/01/2016       Colonscopy 10-6-2010    Immunization History   Administered Date(s) Administered     COVID-19,PF,Pfizer 03/19/2021, 04/09/2021     Tdap 04/01/2016     ZOSTER, RECOMBINANT, IM 01/31/2020       Lab Results   Component Value Date    WBC 7.5 01/31/2020    HGB 15.7 01/31/2020    HCT 45.7 01/31/2020     01/31/2020    CHOL 141 05/15/2019    TRIG 109 05/15/2019    HDL 47 05/15/2019    ALT 22 05/15/2019    AST 18 05/15/2019     01/31/2020    K 4.4 01/31/2020     01/31/2020    CREATININE 1.12 01/31/2020    BUN 14 01/31/2020    CO2 27 01/31/2020    TSH 1.03 05/15/2019    PSA 0.3 05/15/2019    INR 1.01 10/06/2010        Review of Systems: A comprehensive review of systems was negative except as noted.  ---------------------------------------------------------------------------------------------------------------------------    Medications, Allergies, Social, and Problem List   Current Outpatient Medications   Medication Sig Dispense Refill     ascorbic acid, vitamin C, (ASCORBIC ACID WITH TRACIE HIPS) 500 MG tablet Take 500 mg by mouth daily.       aspirin 81 MG EC tablet Take 81 mg by mouth daily.       calcium-vitamin D 500 mg(1,250mg) -200 unit per tablet Take 1 tablet by mouth daily.       SUMAtriptan (IMITREX) 100 MG tablet TAKE 1 TABLET BY MOUTH  EVERY 2 HOURS AS NEEDED FOR MIGRAINE(S). Patient needs to establish care with a new provider for future refills 30 tablet 0     triamterene-hydrochlorothiazide (DYAZIDE) 37.5-25 mg per capsule TAKE 1 CAPSULE BY MOUTH   DAILY FOR HYPERTENSION 90 capsule 3     No current facility-administered medications for this visit.      Allergies   Allergen Reactions     Lisinopril Cough     Social History     Tobacco Use     Smoking status: Never Smoker     Smokeless tobacco: Never Used   Substance Use Topics     Alcohol use: Yes     Drug use: Not on file     Patient Active Problem List   Diagnosis     Chronic depression     Obesity (BMI 30.0-34.9)     Chronic cough     Hypertension     GARCÍA (obstructive sleep apnea)        Reviewed, reconciled and updated       Physical Exam   General Appearance:  Significantly obese, good BP    /83   Pulse 70   Wt (!) 308 lb (139.7 kg)   BMI 37.49 kg/m      General: Alert, in no distress  Skin: No significant lesion seen.  Eyes/nose/throat: Eyes without scleral icterus, eye movements normal, pupils equal and reactive, oropharynx clear, ears with normal TM's  MSK: Neck with good ROM  Lymphatic: Neck without adenopathy or masses  Endocrine: Thyroid with no nodules to palpation  Pulm: Lungs clear to auscultation bilaterally  Cardiac: Heart with regular rate and rhythm, no murmur or gallop  GI: Abdomen soft, nontender. No palpable enlargement of liver or spleen  MSK: Extremities no tenderness or edema  Neuro: Moves all extremities, without focal weakness  Psych: Alert, normal mental status. Normal affect and speech       Additional Information   I spent 40 minutes on this encounter, including reviewing interval history since last visit, examining the patient, explaining and counseling the issues enumerated in the Assessment and Plan (patient given a copy), ordering indicated tests, ordering prescriptions       Dontae Lane MD

## 2021-06-16 NOTE — PATIENT INSTRUCTIONS - HE
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 Quincy Munoz.  Issues are as follows: Hypertension in the context of obesity and obstructive sleep apnea, blood pressure appears to be well controlled on triamterene hydrochlorothiazide, history of ACE inhibitor cough.  Anxiety with OCD tendencies, history of depression, but symptoms have been under decent control since stopping bupropion in May 2020.  Migraine/vascular headaches, for which sumatriptan and has worked as abortive therapy, although he does consume a lot of caffeine.  Obstructive sleep apnea, needs renewal of his CPAP supplies.  Obesity with body mass index of 37.5, I really would like to see him lose 25 pounds this year.  History of left knee meniscus surgery and lateral collateral ligament repair, he has implemented strength training to help stabilize his knee.  Status post bilateral inguinal repair surgery 2018.  Benign prostatic hyperplasia symptoms with nighttime urination about 2-3.  Due for age-appropriate screening colonoscopy, which is ordered for him.  He received a second shot of Pfizer COVID-19 vaccine April 9, 2021.    I have ordered fasting labs for him to draw on future morning, which would be a lipid panel, comprehensive metabolic panel, blood cell counts, A1c to screen for diabetes, TSH thyroid cascade, screening PSA, and vitamin D level.  Screening colonoscopy ordered.  Order entered for CPAP supplies.  Prescription renewal for sumatriptan.    Going issue by issue:    Hypertension in the context of obesity and obstructive sleep apnea, blood pressure appears to be well controlled on triamterene hydrochlorothiazide, history of ACE inhibitor cough.  Today's in clinic blood pressure was pretty decent at 128/83.  He owns a home blood pressure machine, and I want him to keep using that.  Target is 120/80 measured at rest in seated position.  Losing weight  will help reduce his dependence on blood pressure medication.  His kidney function historically has been normal, will recheck that on this round of testing.  I think triamterene hydrochlorothiazide is fine to continue.    Anxiety with OCD tendencies, history of depression, but symptoms have been under decent control since stopping bupropion in May 2020.  He had more significant depression symptoms in his 30s.  Nowadays he manages with cognitive behavioral therapy type of techniques.  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  He Once upon a time use Celexa.  He would rather not use medication.  I told him about the possibly of trying meditation and other mindfulness practices.  Alcohol consumption sounds modest, about a beer a day, although that is a source of calories.    Migraine/vascular headaches, for which sumatriptan and has worked as abortive therapy, although he does consume a lot of caffeine.  We will renew his prescription for sumatriptan.    Obstructive sleep apnea, needs renewal of his CPAP supplies.      Obesity with body mass index of 37.5, I really would like to see him lose 25 pounds this year.  I reminded him about the importance of eating slowly, controlling portion size, and identifying problem foods to curtail or eliminate.  He recently purchased a Nordic track exercise bike.  I wanted to get at least 30 minutes of vigorous physical activity every day.  That will also help quality of sleep and blood pressure.  I reminded him about the carbohydrate calorie content of beer.    History of left knee meniscus surgery and lateral collateral ligament repair, he has implemented strength training to help stabilize his knee.      Status post bilateral inguinal repair surgery 2018.     Benign prostatic hyperplasia symptoms with nighttime urination about 2-3.  I told him we could consider medication to improve BPH symptoms.  If he  wants to try medication, start with the alpha-blocker tamsulosin 0.4 mg once a day.  Another medication To consider is finasteride, which gradually shrink the prostate gland over the course of a year by blocking the action of testosterone on prostate gland tissue.  Tamsulosin and finasteride are often combined together.  We will check a screening PSA on this round of testing.    Due for age-appropriate screening colonoscopy, which is ordered for him.      He received a second shot of Pfizer COVID-19 vaccine April 9, 2021.

## 2021-06-17 NOTE — TELEPHONE ENCOUNTER
Pt is here for Shingles vaccine. No orders. Order pended. Okay per Dr. Solomon to sign.    [facility] ............... 2:53 PM, 04/26/21

## 2021-06-18 NOTE — PROGRESS NOTES
Preoperative Exam    Scheduled Procedure: Hernia Repair  Surgery Date:  06/06/18  Surgery Location: [facility]    Surgeon:  Dr Jose Sosa    Assessment/Plan:     1. Inguinal hernia-symptomatic with discomfort  Bilateral inguinal repair by Dr. Sosa.  - HM1(CBC and Differential)  - Comprehensive Metabolic Panel  - Electrocardiogram Perform - Clinic  - 1 (CBC with Diff)    2. Screen for colon cancer  Future colonoscopy    3. GARCÍA (obstructive sleep apnea)  Bring CPAP to surgical center with you    4. Obesity (BMI 30.0-34.9)  Discussed diet exercise weight loss    5. Chronic depression  Stable on Rx        Plan  1.  Medically cleared for surgery and anesthesia  2.  Hold aspirin, fish oil, antiplatelet agents preoperative  3.  Bring CPAP surgical center  4.  Preoperative lab-ordered  5.  ECG-done today-normal    Dr. Sosa thank you for this consultation.  If you have any questions or concerns please do not hesitate to contact me    Lukas Vicente MD  Internal medicine  [facility]  [phone]  [email or web address]          Surgical Procedure Risk: Low (reported cardiac risk generally < 1%)  Have you had prior anesthesia?: Yes  Have you or any family members had a previous anesthesia reaction:  No  Do you or any family members have a history of a clotting or bleeding disorder?: No  Cardiac Risk Assessment: no increased risk for major cardiac complications    Patient approved for surgery with general or local anesthesia.    Please Note:  Patient uses a CPAP machine.    Functional Status: Independent  Patient plans to recover at home with family.     Subjective:      Jose Peres is a 52 y.o. male who presents for a preoperative consultation.  Patient has bilateral symptomatic small easily reducible inguinal herniorrhaphies.  They are symptomatic however.  He has discomfort.  His bowels are regular.  He does have frequent urination.  He does drink a lot of coffee.  He is  not diabetic.    Patient has tolerated surgeries well in the past.  No history of phlebitis, infection, bleeding    Patient is a healthy individual.    All other systems reviewed and are negative, other than those listed in the HPI.    Pertinent History  Do you have difficulty breathing or chest pain after walking up a flight of stairs: No  History of obstructive sleep apnea: Yes: Uses CPAP  Steroid use in the last 6 months: No  Frequent Aspirin/NSAID use: Yes: On daily aspirin  Prior Blood Transfusion: No  Prior Blood Transfusion Reaction: No  If for some reason prior to, during or after the procedure, if it is medically indicated, would you be willing to have a blood transfusion?:  There is no transfusion refusal.    Current Outpatient Prescriptions   Medication Sig Dispense Refill     aspirin 81 MG EC tablet Take 81 mg by mouth daily.       buPROPion (WELLBUTRIN SR) 150 MG 12 hr tablet TAKE 1 TABLET TWICE A  tablet 0     cholecalciferol, vitamin D3, 1,000 unit tablet Take 1,000 Units by mouth daily.       fluticasone (FLONASE) 50 mcg/actuation nasal spray 1 spray into each nostril daily. 16 g 12     multivitamin therapeutic (THERAGRAN) tablet Take 1 tablet by mouth daily.       OMEGA-3-DHA-EPA-TUNA OIL ORAL Take by mouth daily.       SUMAtriptan (IMITREX) 100 MG tablet TAKE 1 TABLET (100 MG TOTAL) BY MOUTH EVERY 2 (TWO) HOURS AS NEEDED FOR MIGRAINE. 10 tablet 3     UNABLE TO FIND daily. Med Name: Catalyn (natural supplement)       No current facility-administered medications for this visit.         Allergies   Allergen Reactions     Lisinopril Cough       Patient Active Problem List   Diagnosis     Chronic depression     Obesity (BMI 30.0-34.9)     Chronic cough     Hypertension     GARCÍA (obstructive sleep apnea)       Past Medical History:   Diagnosis Date     Depression     HOSPITALIZATION 1996     HTN (hypertension)      Migraines      Nephrolithiasis 2008     Obesity (BMI 30-39.9)     BMI 36.8     OCD  "(obsessive compulsive disorder)        Past Surgical History:   Procedure Laterality Date     KNEE ARTHROSCOPY Bilateral      PILONIDAL ABSCESS         Social History     Social History     Marital status:      Spouse name: N/A     Number of children: N/A     Years of education: N/A     Occupational History     Not on file.     Social History Main Topics     Smoking status: Never Smoker     Smokeless tobacco: Never Used     Alcohol use Yes     Drug use: Not on file     Sexual activity: Not on file     Other Topics Concern     Not on file     Social History Narrative     BJORN 1990    ANDREY 1995    [facility] [employer] [facility]    IT/                      Objective:     Vitals:    05/30/18 1444   BP: 124/86   Pulse: 64   SpO2: 96%   Weight: (!) 286 lb 0.6 oz (129.7 kg)   Height: 6' 4\" (1.93 m)         Physical Exam:  Skin: Normal. No rash or lesion  Head:  Normocephalic, symmetric  Speech-clear  Eyes: Eyes midline full EOM.  External exams normal.  No icterus  Neck:  No palpable masses, lymphadenopathy or tenderness. No thyromegaly or goiter  Carotid Arteries:  Equal pulsations bilateral.No Bruit, normal upstroke  Chest Wall: No deformity or pain elicited on compression.  Respiratory:  Normal respiratory effort.  Lungs are clear with good breath sounds.  No dullness.  No wheezing.  Heart: Regular rhythm.  Normal sounding S1, S2 without S3, S4, murmurs, rubs, or gallops.  Benign abdomen   Extremities-no edema excellent pulses.   Gait-normal    There are no Patient Instructions on file for this visit.    EKG: Normal sinus rhythm  Rate 62  Normal ECG    Labs:  ordered    Immunization History   Administered Date(s) Administered     Tdap 04/01/2016           Electronically signed by Lukas Vicente MD 05/30/18 2:46 PM  "

## 2021-06-21 NOTE — LETTER
Letter by Dontae Lane MD at      Author: Dontae Lane MD Service: -- Author Type: --    Filed:  Encounter Date: 4/27/2021 Status: (Other)         Jose Peres  [address]  [place] [address]             April 27, 2021         Dear Mr. Peres,    Your test results came back all looking quite good, including the comprehensive metabolic panel, blood cell counts, lipid (cholesterol profile), A1c test for diabetes, TSH thyroid test, PSA prostate test, and vitamin D level.    Let's continue the plan to focus on weight loss, which will help with blood pressure control.    Resulted Orders   Comprehensive Metabolic Panel   Result Value Ref Range    Sodium 137 136 - 145 mmol/L    Potassium 4.1 3.5 - 5.0 mmol/L    Chloride 102 98 - 107 mmol/L    CO2 25 22 - 31 mmol/L    Anion Gap, Calculation 10 5 - 18 mmol/L    Glucose 93 70 - 125 mg/dL    BUN 16 8 - 22 mg/dL    Creatinine 1.06 0.70 - 1.30 mg/dL    GFR MDRD Af Amer >60 >60 mL/min/1.73m2    GFR MDRD Non Af Amer >60 >60 mL/min/1.73m2    Bilirubin, Total 0.7 0.0 - 1.0 mg/dL    Calcium 9.6 8.5 - 10.5 mg/dL    Protein, Total 7.1 6.0 - 8.0 g/dL    Albumin 4.2 3.5 - 5.0 g/dL    Alkaline Phosphatase 67 45 - 120 U/L    AST 17 0 - 40 U/L    ALT 17 0 - 45 U/L    Narrative    Fasting Glucose reference range is 70-99 mg/dL per  American Diabetes Association (ADA) guidelines.   HM2(CBC w/o Differential)   Result Value Ref Range    WBC 9.5 4.0 - 11.0 thou/uL    RBC 5.26 4.40 - 6.20 mill/uL    Hemoglobin 16.2 14.0 - 18.0 g/dL    Hematocrit 48.0 40.0 - 54.0 %    MCV 91 80 - 100 fL    MCH 30.8 27.0 - 34.0 pg    MCHC 33.8 32.0 - 36.0 g/dL    RDW 12.2 11.0 - 14.5 %    Platelets 260 140 - 440 thou/uL    MPV 10.3 (H) 7.0 - 10.0 fL   Lipid Cascade   Result Value Ref Range    Cholesterol 149 <=199 mg/dL    Triglycerides 102 <=149 mg/dL    HDL Cholesterol 46 >=40 mg/dL    LDL Calculated 83 <=129 mg/dL    Patient Fasting > 8hrs? No    Glycosylated Hemoglobin A1c   Result Value  Ref Range    Hemoglobin A1c 5.5 <=5.6 %   Thyroid Cascade   Result Value Ref Range    TSH 0.89 0.30 - 5.00 uIU/mL   PSA (Prostatic-Specific Antigen), Annual Screen   Result Value Ref Range    PSA 0.4 0.0 - 3.5 ng/mL    Narrative    Method is Abbott Prostate-Specific Antigen (PSA)  Standard-WHO 1st International (90:10)   Vitamin D, Total (25-Hydroxy)   Result Value Ref Range    Vitamin D, Total (25-Hydroxy) 38.2 30.0 - 80.0 ng/mL    Narrative    Deficiency <10.0 ng/mL  Insufficiency 10.0-29.9 ng/mL  Sufficiency 30.0-80.0 ng/mL  Toxicity (possible) >100.0 ng/mL           Please call with questions or contact us using [employer].    Sincerely,        Electronically signed by Dontae Lane MD

## 2021-06-25 NOTE — PROGRESS NOTES
Progress Notes by Lukas Vicente MD at 1/27/2017  7:40 AM     Author: Lukas Vicente MD Service: -- Author Type: Physician    Filed: 1/27/2017  9:06 AM Encounter Date: 1/27/2017 Status: Addendum    : Lukas Vicente MD (Physician)    Related Notes: Original Note by Lukas Vicente MD (Physician) filed at 1/27/2017  8:36 AM       OFFICE VISIT NOTE  Jose Peres   51 y.o. male      Subjective:   Chief Complaint:  Cough (Persistant) and Knee Pain (Right sided. Moves from hip to back of knee. Comes and goes x 6m)    New complaint  Dry chronic cough  Present since last winter-8-10 months  We'll have coughing Jags  No shortness of breath or hemoptysis  Lifetime nonsmoker  Other people notice it  No sinus drainage  No stef reflux  No history of asthma  Intermittent throughout the day  No particular triggering factors  Has never used an inhaler.  Has never had this before    Significantly on an ACE inhibitor  He has been on this about the same duration of time    Patient has fatigue  Family notes he sleeps easily  Obese  17 neck size  No witnessed apneas  Male  Stop bang score 5  Review of Systems:     Extensive 10-point review of systems was performed. Please see the HPI for problem specific pertinent review of systems.     Patient does note feels well otherwise.  Has gained weight     Some intermittent right trochanter pain.  No trouble walking    Otherwise, the following systems are noncontributory including constitutional, eyes, ears, nose and throat, cardiovascular, respiratory, gastrointestinal, genitourinary, musculoskeletal,neurological, skin and/or breast, endocrine, hematologic/lymph, allergic/immunologic and psychiatric.              Medications:  Current Outpatient Prescriptions   Medication Sig Dispense Refill   ? aspirin 81 MG EC tablet Take 81 mg by mouth daily.     ? buPROPion (WELLBUTRIN SR) 150 MG 12 hr tablet Take 1 tablet (150 mg total) by mouth 2 (two) times a day. 180 tablet 3   ?  "cholecalciferol, vitamin D3, 1,000 unit tablet Take 1,000 Units by mouth daily.     ? multivitamin therapeutic (THERAGRAN) tablet Take 1 tablet by mouth daily.     ? OMEGA-3-DHA-EPA-TUNA OIL ORAL Take by mouth daily.     ? SUMAtriptan (IMITREX) 100 MG tablet TAKE 1 TABLET (100 MG TOTAL) BY MOUTH EVERY 2 (TWO) HOURS AS NEEDED FOR MIGRAINE. 10 tablet 2   ? UNABLE TO FIND daily. Med Name: Catalyn (natural supplement)       No current facility-administered medications for this visit.      Current Outpatient Prescriptions on File Prior to Visit   Medication Sig   ? buPROPion (WELLBUTRIN SR) 150 MG 12 hr tablet Take 1 tablet (150 mg total) by mouth 2 (two) times a day.   ? cholecalciferol, vitamin D3, 1,000 unit tablet Take 1,000 Units by mouth daily.   ? multivitamin therapeutic (THERAGRAN) tablet Take 1 tablet by mouth daily.   ? OMEGA-3-DHA-EPA-TUNA OIL ORAL Take by mouth daily.   ? SUMAtriptan (IMITREX) 100 MG tablet TAKE 1 TABLET (100 MG TOTAL) BY MOUTH EVERY 2 (TWO) HOURS AS NEEDED FOR MIGRAINE.   ? [DISCONTINUED] lisinopril (PRINIVIL,ZESTRIL) 10 MG tablet TAKE 1 TABLET (10 MG TOTAL) BY MOUTH DAILY.     No current facility-administered medications on file prior to visit.        Allergies:No Known Allergies    PSFHx: Tobacco Status:  He  reports that he has never smoked. He does not have any smokeless tobacco history on file.   Alcohol Status:    History   Alcohol Use   ? Yes       reports that he has never smoked. He does not have any smokeless tobacco history on file. He reports that he drinks alcohol. His drug history is not on file.    Objective:      Visit Vitals   ? /72 (Patient Site: Left Arm, Patient Position: Sitting, Cuff Size: Adult Large)   ? Pulse 72   ? Temp 97.9  F (36.6  C)   ? Ht 6' 3\" (1.905 m)   ? Wt (!) 292 lb 0.6 oz (132.5 kg)   ? SpO2 96%   ? BMI 36.5 kg/m2     Weight:   Wt Readings from Last 3 Encounters:   01/27/17 (!) 292 lb 0.6 oz (132.5 kg)   04/01/16 (!) 280 lb 0.6 oz (127 kg) "   11/25/15 (!) 272 lb 1.3 oz (123.4 kg)     BP Readings from Last 3 Encounters:   01/27/17 104/72   04/01/16 102/68   11/25/15 96/64         General-appears well, no acute distress.  BP Readings from Last 10 Encounters:   01/27/17 104/72   04/01/16 102/68   11/25/15 96/64     Skin: Normal. No rash or lesion  Lymph Nodes: None palpable-including neck, axilla, inguinal, epitrochlear.  Head:  Normocephalic.    Eyes: Midline.  Equal size., full ROM.  External exams normal.  No icterus  Ears:  Normal pinnae, canals, and TM's.    Nose:  Patent, without deformity.    Throat:  Moist mucous membranes without lesions, erythema, or exudate.    Neck: No palpable masses, lymphadenopathy or tenderness.No thyromegaly or goiter.  No thyroid nodule.  Carotid Arteries:  No Bruit.  Carotid upstroke normal  Chest Wall: No deformity or pain elicited on compression.  Respiratory:  Normal respiratory effort.  Lungs are clear with good breath sounds.  No dullness.  No wheezing.  Heart: Regular rhythm.  Normal sounding S1, S2 without S3, S4, murmurs, rubs, or gallops.      Gait-normal  Hip flexion normal  Pain right greater trochanter  Review of clinical lab tests  Lab Results   Component Value Date    WBC 10.5 04/01/2016    HGB 15.4 04/01/2016    HCT 45.5 04/01/2016     04/01/2016    CHOL 144 04/01/2016    TRIG 108 04/01/2016    HDL 41 04/01/2016    ALT 21 04/01/2016    AST 20 04/01/2016     04/01/2016    K 3.9 04/01/2016     04/01/2016    CREATININE 1.11 04/01/2016    BUN 13 04/01/2016    CO2 23 04/01/2016    TSH 0.80 04/01/2016    PSA 0.4 04/01/2016    INR 1.01 10/06/2010         No results found for this or any previous visit (from the past 24 hour(s)).    RADIOLOGY: Xr Chest Pa And Lateral    Result Date: 1/27/2017  XR CHEST PA AND LATERAL1/27/2017 8:40 AMINDICATION: CoughCOMPARISON: None.FINDINGS: Mildly tortuous or dilated ascending aorta. Chest otherwise normal. Lungs are clear.This report was electronically  interpreted by: Dr. Brian Peña MD ON 01/27/2017 at  08:55      Review of recent consultation-chest x-ray -2012      Administered stop bang score 5        Assessment/Plan for  Jose Peres is a 51 y.o. male.  No Patient Care Coordination Note on file.       There are no diagnoses linked to this encounter.   1.  Chronic cough.  Probable ACE inhibitor.  DC ACE inhibitor and monitor blood pressure.  Check chest x-ray spiral eosinophil  2.  Fatigue with stop bang score 5.  Distant sleep apnea test negative.  Has gained weight.  We'll set up for sleep study  3.  Clinical trochanteric bursitis.  Physical therapy, cold packs.  If no change x-ray  4.  Obesity-discussed diet exercise weight loss    Plan:  Discontinue ACE inhibitor  Monitor blood pressure  Restrict salt  Sleep study  The following high BMI interventions were performed this visit: encouragement to exercise, weight monitoring and prescribed dietary intake     Physical maze/June 2017      Patient Instructions       STOP-Bang questionnaire      Yes   No Snoring?  Do you snore loudly (loud enough to be heard through closed doors, or your bed partner elbows you for snoring at night)?     Yes   No Tired?  Do you often feel tired, fatigued, or sleepy during the daytime (such as falling asleep during driving)?     Yes   No Observed?  Has anyone observed you stop breathing or choking/gasping during your sleep?     Yes   No Pressure?  Do you have or are being treated for high blood pressure?     Yes   No Body mass index more than 35 kg/m2?     Yes   No Age older than 50 years old?     Yes   No Neck size large? (measured around Ervin's apple)  For male, is your shirt collar 17 inches or larger?  For female, is your shirt collar 16 inches or larger?     Yes   No Gender = Male?   Scoring criteria*:   For general population   Low risk of GARCÍA: Yes to 0 to 2 questions   Intermediate risk of GARCÍA: Yes to 3 to 4 questions   High risk of GARCÍA: Yes to 5 to 8 questions   GARCÍA:  obstructive sleep apnea  * For validated scoring criteria in obese patients, please refer to UpToDate topic on surgical risk and the preoperative evaluation and management of adults with obstructive sleep apnea.  References:  1. Stephon SYED, Devin SILVA, Drew P, et al. STOP questionnaire: a tool to screen patients for obstructive sleep apnea. Anesthesiology 2008; 108:812.  2. Stephon SYED, Maria A NIETO, Drew HEMPHILL, et al. High STOP-Bang score indicates a high probability of obstructive sleep apnoea. Br J Anaesth 2012; 108:768.          Mortality -- Patients with untreated severe GARCÍA (ie, AHI ?30 events per hour) have a two- to threefold increased risk of all-cause mortality compared with individuals without GARCÍA, independent of other risk factors such as obesity and cardiovascular disease        Cardiovascular morbidity -- Patients with GARCÍA, particularly when it is moderate or severe and untreated, are at increased risk for a broad range of cardiovascular morbidities, including systemic hypertension, pulmonary arterial hypertension, coronary artery disease, cardiac arrhythmias, heart failure, and stroke.                Diagnoses and all orders for this visit:    Snoring  -     Ambulatory referral to Sleep Medicine    Chronic cough  -     XR Chest PA and Lateral; Future; Expected date: 1/27/17  -     Spirometry without bronchodilator  -     HM1(CBC and Differential)  -     HM1 (CBC with Diff)    Hypertension    Fatigue  -     Ambulatory referral to Sleep Medicine    Trochanteric bursitis                Lukas Vicente MD  Internal medicine  [facility]  [phone]  [email or web address]      This is an electronically verified report by Lukas Vicente M.D.  (Note created with Dragon voice recognition and unintended spelling errors and word substitutions may occur)

## 2021-07-03 NOTE — ADDENDUM NOTE
Addendum Note by Bea Vicente MD at 1/31/2020  9:20 AM     Author: Bea Vicente MD Service: -- Author Type: Physician    Filed: 1/31/2020 11:25 AM Encounter Date: 1/31/2020 Status: Signed    : Bea Vicente MD (Physician)    Addended by: BEA VICENTE on: 1/31/2020 11:25 AM        Modules accepted: Orders

## 2021-07-03 NOTE — ADDENDUM NOTE
Addendum Note by Bea Vicente MD at 1/27/2017  9:06 AM     Author: Bea Vicente MD Service: -- Author Type: Physician    Filed: 1/27/2017  9:06 AM Encounter Date: 1/27/2017 Status: Signed    : Bea Vicente MD (Physician)    Addended by: BEA VICENTE on: 1/27/2017 09:06 AM        Modules accepted: Orders

## 2021-08-18 DIAGNOSIS — I10 HYPERTENSION, UNSPECIFIED TYPE: ICD-10-CM

## 2021-08-22 NOTE — TELEPHONE ENCOUNTER
"Routing refill request to provider for review/approval because:  Early refill request    Last Written Prescription Date:  10/9/20  Last Fill Quantity: 90,  # refills: 3   Last office visit provider:  4/9/21     Requested Prescriptions   Pending Prescriptions Disp Refills     triamterene-HCTZ (DYAZIDE) 37.5-25 MG capsule 90 capsule 3     Sig: Take 1 capsule by mouth daily       Diuretics (Including Combos) Protocol Passed - 8/18/2021  1:40 PM        Passed - Blood pressure under 140/90 in past 12 months     BP Readings from Last 3 Encounters:   04/09/21 128/83   01/31/20 118/84                 Passed - Recent (12 mo) or future (30 days) visit within the authorizing provider's specialty     Patient has had an office visit with the authorizing provider or a provider within the authorizing providers department within the previous 12 mos or has a future within next 30 days. See \"Patient Info\" tab in inbasket, or \"Choose Columns\" in Meds & Orders section of the refill encounter.              Passed - Medication is active on med list        Passed - Patient is age 18 or older        Passed - Normal serum creatinine on file in past 12 months     Recent Labs   Lab Test 04/26/21  1448   CR 1.06              Passed - Normal serum potassium on file in past 12 months     Recent Labs   Lab Test 04/26/21  1448   POTASSIUM 4.1                    Passed - Normal serum sodium on file in past 12 months     Recent Labs   Lab Test 04/26/21  1448                      Favian Casas RN 08/22/21 12:46 PM  "

## 2021-08-23 RX ORDER — TRIAMTERENE AND HYDROCHLOROTHIAZIDE 37.5; 25 MG/1; MG/1
1 CAPSULE ORAL DAILY
Qty: 90 CAPSULE | Refills: 3 | Status: SHIPPED | OUTPATIENT
Start: 2021-08-23 | End: 2022-09-30

## 2021-09-26 ENCOUNTER — HEALTH MAINTENANCE LETTER (OUTPATIENT)
Age: 56
End: 2021-09-26

## 2021-12-23 ENCOUNTER — IMMUNIZATION (OUTPATIENT)
Dept: NURSING | Facility: CLINIC | Age: 56
End: 2021-12-23
Payer: COMMERCIAL

## 2021-12-23 PROCEDURE — 91300 PR COVID VAC PFIZER DIL RECON 30 MCG/0.3 ML IM: CPT

## 2021-12-23 PROCEDURE — 0004A PR COVID VAC PFIZER DIL RECON 30 MCG/0.3 ML IM: CPT

## 2022-07-02 ENCOUNTER — HEALTH MAINTENANCE LETTER (OUTPATIENT)
Age: 57
End: 2022-07-02

## 2022-08-17 ENCOUNTER — IMMUNIZATION (OUTPATIENT)
Dept: NURSING | Facility: CLINIC | Age: 57
End: 2022-08-17
Payer: COMMERCIAL

## 2022-08-17 PROCEDURE — 91305 COVID-19,PF,PFIZER (12+ YRS): CPT

## 2022-08-17 PROCEDURE — 0054A COVID-19,PF,PFIZER (12+ YRS): CPT

## 2022-09-28 ENCOUNTER — TELEPHONE (OUTPATIENT)
Dept: INTERNAL MEDICINE | Facility: CLINIC | Age: 57
End: 2022-09-28

## 2022-09-28 NOTE — TELEPHONE ENCOUNTER
Patient calling regarding positive test last week, has had lingering symptoms but has been improving.     No fever, no chills, no body aches. All of his symptoms have resolved except for his loss of taste and smell.     His symptoms began on 9/19/22, over a week ago.   He is still feeling fatigued but has been worn out from travel.     Advised sometimes things can linger.     He still plans to come in for the physical.    Advised to let us know if any changes prior to his visit.     Angie Yoon RN, BSN  [facility]

## 2022-09-30 ENCOUNTER — OFFICE VISIT (OUTPATIENT)
Dept: INTERNAL MEDICINE | Facility: CLINIC | Age: 57
End: 2022-09-30
Payer: COMMERCIAL

## 2022-09-30 VITALS
SYSTOLIC BLOOD PRESSURE: 118 MMHG | HEIGHT: 76 IN | WEIGHT: 295 LBS | HEART RATE: 75 BPM | BODY MASS INDEX: 35.92 KG/M2 | DIASTOLIC BLOOD PRESSURE: 80 MMHG | OXYGEN SATURATION: 96 %

## 2022-09-30 DIAGNOSIS — N52.9 ERECTILE DYSFUNCTION, UNSPECIFIED ERECTILE DYSFUNCTION TYPE: ICD-10-CM

## 2022-09-30 DIAGNOSIS — G47.33 OSA (OBSTRUCTIVE SLEEP APNEA): ICD-10-CM

## 2022-09-30 DIAGNOSIS — Z12.5 SCREENING PSA (PROSTATE SPECIFIC ANTIGEN): ICD-10-CM

## 2022-09-30 DIAGNOSIS — N40.1 BENIGN PROSTATIC HYPERPLASIA WITH NOCTURIA: ICD-10-CM

## 2022-09-30 DIAGNOSIS — Z00.00 ROUTINE GENERAL MEDICAL EXAMINATION AT A HEALTH CARE FACILITY: Primary | ICD-10-CM

## 2022-09-30 DIAGNOSIS — G44.1 VASCULAR HEADACHE: ICD-10-CM

## 2022-09-30 DIAGNOSIS — Z12.11 SCREEN FOR COLON CANCER: ICD-10-CM

## 2022-09-30 DIAGNOSIS — R35.1 BENIGN PROSTATIC HYPERPLASIA WITH NOCTURIA: ICD-10-CM

## 2022-09-30 DIAGNOSIS — G43.009 MIGRAINE WITHOUT AURA AND WITHOUT STATUS MIGRAINOSUS, NOT INTRACTABLE: ICD-10-CM

## 2022-09-30 DIAGNOSIS — E66.01 MORBID OBESITY (H): ICD-10-CM

## 2022-09-30 DIAGNOSIS — Z13.1 SCREENING FOR DIABETES MELLITUS: ICD-10-CM

## 2022-09-30 DIAGNOSIS — I10 ESSENTIAL HYPERTENSION, BENIGN: ICD-10-CM

## 2022-09-30 PROCEDURE — 99396 PREV VISIT EST AGE 40-64: CPT | Performed by: INTERNAL MEDICINE

## 2022-09-30 RX ORDER — TRIAMTERENE AND HYDROCHLOROTHIAZIDE 37.5; 25 MG/1; MG/1
1 CAPSULE ORAL DAILY
Qty: 90 CAPSULE | Refills: 3 | Status: SHIPPED | OUTPATIENT
Start: 2022-09-30 | End: 2023-10-12

## 2022-09-30 RX ORDER — SILDENAFIL CITRATE 20 MG/1
TABLET ORAL
Qty: 60 TABLET | Refills: 3 | Status: SHIPPED | OUTPATIENT
Start: 2022-09-30 | End: 2023-10-12

## 2022-09-30 RX ORDER — SUMATRIPTAN 100 MG/1
100 TABLET, FILM COATED ORAL
Qty: 30 TABLET | Refills: 1 | Status: SHIPPED | OUTPATIENT
Start: 2022-09-30 | End: 2023-11-13

## 2022-09-30 ASSESSMENT — ENCOUNTER SYMPTOMS
HEMATURIA: 0
HEMATOCHEZIA: 0
SORE THROAT: 0
DIZZINESS: 1
NERVOUS/ANXIOUS: 0
ABDOMINAL PAIN: 0
SHORTNESS OF BREATH: 0
WEAKNESS: 1
DYSURIA: 0
DIARRHEA: 0
JOINT SWELLING: 0
ARTHRALGIAS: 0
MYALGIAS: 0
PALPITATIONS: 0
COUGH: 1
FREQUENCY: 0
FEVER: 0
PARESTHESIAS: 0
CHILLS: 0
CONSTIPATION: 0
HEARTBURN: 0
HEADACHES: 0
NAUSEA: 0

## 2022-09-30 NOTE — PATIENT INSTRUCTIONS
Annual preventive visit, Jose is doing well since her last meeting in the spring 2021.  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    I renewed his prescription for triamterene hydrochlorothiazide for blood pressure, sumatriptan for migraines, and also issuing a prescription for generic sildenafil for ED    COVID-19 infection, symptoms onset Monday, September 19, while he was traveling in [place], and then he flew back home on Friday, September 23, then he had a positive home antigen test on Saturday, September 24, symptoms were cough and head cold, symptoms, also loss of smell, and as of today September 30 he reports that he feels almost back to normal except little bit of head pressure.    He did not take any antiviral medication.  COVID-19,PF,Pfizer (12+ Yrs) 03/19/2021, 04/09/2021, 12/23/2021   COVID-19,PF,Pfizer 12+ Yrs (2022 and After) 08/17/2022     I told Jose that he should hold off on the bivalent booster for at least 2 months, maybe 3, and that he probably will have at least several months of immunity against omicron    Hypertension in the context of obesity and obstructive sleep apnea, blood pressure appears to be well controlled on triamterene hydrochlorothiazide, history of ACE inhibitor cough.    He owns a home blood pressure machine, and I want him to keep using that.  Target is 120/80 measured at rest in seated position.  Losing weight will help reduce his dependence on blood pressure medication.  His kidney function historically has been normal, will recheck that on this round of testing.  I think triamterene hydrochlorothiazide is fine to continue.    BP Readings from Last 6  Encounters:   09/30/22 118/80   04/09/21 128/83   01/31/20 118/84      Anxiety with OCD tendencies, history of depression, but symptoms have been under decent control since stopping bupropion in May 2020.      He had more significant depression symptoms in his 30s.  Nowadays he manages with cognitive behavioral therapy type of techniques.  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  He Once upon a time use Celexa.      Alcohol consumption sounds modest, about a beer a day, although that is a source of calories.     Migraine/vascular headaches, for which sumatriptan and has worked as abortive therapy, although he does consume a lot of caffeine.      Obstructive sleep apnea, needs renewal of his CPAP supplies.       Obesity with body mass index of 35.9, I really would like to see him lose 25 pounds this year.   I reminded him about the importance of eating slowly, controlling portion size, and identifying problem foods to curtail or eliminate.  He recently purchased a Nordic track exercise bike.     I wanted to get at least 30 minutes of vigorous physical activity every day.  That will also help quality of sleep and blood pressure.  I reminded him about the carbohydrate calorie content of beer.    Wt Readings from Last 5 Encounters:   09/30/22 133.8 kg (295 lb)   04/09/21 139.7 kg (308 lb)   01/31/20 133 kg (293 lb 1.9 oz)   05/15/19 129.7 kg (286 lb)   05/30/18 129.7 kg (286 lb 0.6 oz)     History of left knee meniscus surgery and lateral collateral ligament repair in 1980s, he has implemented strength training to help stabilize his knee.       Status post bilateral inguinal repair surgery 2018.      Benign prostatic hyperplasia symptoms with nighttime urination about 2-3.    I told him we could consider medication to improve BPH symptoms.  If he wants to try medication, start with the alpha-blocker tamsulosin 0.4 mg once a day.  Another  medication To consider is finasteride, which gradually shrink the prostate gland over the course of a year by blocking the action of testosterone on prostate gland tissue.  Tamsulosin and finasteride are often combined together.  We will check a screening PSA on this round of testing.    Erectile dysfunction, common type  Will have him try generic sildenafil 20 mg tablet, start with 2 tablets, taken 1 hour prior to activity, maximum one dose in 24 hours.  He can find what his eventual doses, max will be five 20 mg tablets equals 100 mg  I told him about potential side effects of low blood pressure, flushing, nausea, diarrhea, same as what would be listed on the Viagra manufactures website.    Bilateral earwax, I reminded Jose to use over-the-counter wax dissolving eardrops, example brand Debrox or Murine     Due for age-appropriate screening colonoscopy, which is ordered for him.       Immunization History   Administered Date(s) Administered    COVID-19,PF,Pfizer (12+ Yrs) 03/19/2021, 04/09/2021, 12/23/2021    COVID-19,PF,Pfizer 12+ Yrs (2022 and After) 08/17/2022    Flu, Unspecified 10/10/2021    Influenza (IIV3) PF 10/11/2005    Influenza Vaccine IM > 6 months Valent IIV4 (Alfuria,Fluzone) 09/11/2020    Influenza,INJ,MDCK,PF,Quad >6mo(Flucelvax-RMG) 09/02/2022    TD (ADULT, 7+) 10/11/2005    Tdap (Adacel,Boostrix) 04/01/2016    Zoster vaccine recombinant adjuvanted (SHINGRIX) 01/31/2020, 04/26/2021

## 2022-09-30 NOTE — PROGRESS NOTES
SUBJECTIVE:   CC: Jose is an 56 year old who presents for preventative health visit.     Patient has been advised of split billing requirements and indicates understanding: Yes  Healthy Habits:     Getting at least 3 servings of Calcium per day:  Yes    Bi-annual eye exam:  Yes    Dental care twice a year:  Yes    Sleep apnea or symptoms of sleep apnea:  Sleep apnea    Diet:  Regular (no restrictions)    Frequency of exercise:  4-5 days/week    Duration of exercise:  30-45 minutes    Taking medications regularly:  Yes    Medication side effects:  None    PHQ-2 Total Score: 0    Additional concerns today:  Yes      Today's PHQ-2 Score:   PHQ-2 ( 1999 Pfizer) 9/30/2022   Q1: Little interest or pleasure in doing things 0   Q2: Feeling down, depressed or hopeless 0   PHQ-2 Score 0   Q1: Little interest or pleasure in doing things Not at all   Q2: Feeling down, depressed or hopeless Not at all   PHQ-2 Score 0     Abuse: Current or Past(Physical, Sexual or Emotional)- No  Do you feel safe in your environment? Yes    Have you ever done Advance Care Planning? (For example, a Health Directive, POLST, or a discussion with a medical provider or your loved ones about your wishes): Yes, advance care planning is on file.    Social History     Tobacco Use     Smoking status: Never Smoker     Smokeless tobacco: Never Used   Substance Use Topics     Alcohol use: Yes     If you drink alcohol do you typically have >3 drinks per day or >7 drinks per week? No    Alcohol Use 9/30/2022   Prescreen: >3 drinks/day or >7 drinks/week? No       Last PSA:   Prostate Specific Antigen Screen   Date Value Ref Range Status   04/26/2021 0.4 0.0 - 3.5 ng/mL Final       Reviewed orders with patient. Reviewed health maintenance and updated orders accordingly - Yes  Lab work is in process    Reviewed and updated as needed this visit by clinical staff   Tobacco  Allergies  Meds                Reviewed and updated as needed this visit by  "Provider      Review of Systems   Constitutional: Negative for chills and fever.   HENT: Positive for congestion, ear pain and hearing loss. Negative for sore throat.    Eyes: Negative for visual disturbance.   Respiratory: Positive for cough. Negative for shortness of breath.    Cardiovascular: Negative for chest pain, palpitations and peripheral edema.   Gastrointestinal: Negative for abdominal pain, constipation, diarrhea, heartburn, hematochezia and nausea.   Genitourinary: Positive for impotence. Negative for dysuria, frequency, genital sores, hematuria, penile discharge and urgency.   Musculoskeletal: Negative for arthralgias, joint swelling and myalgias.   Skin: Negative for rash.   Neurological: Positive for dizziness and weakness. Negative for headaches and paresthesias.   Psychiatric/Behavioral: Negative for mood changes. The patient is not nervous/anxious.      OBJECTIVE:   /80 (BP Location: Right arm, Patient Position: Sitting, Cuff Size: Adult Large)   Pulse 75   Ht 1.93 m (6' 4\")   Wt 133.8 kg (295 lb)   SpO2 96%   BMI 35.91 kg/m      Physical Exam     General: Alert, in no distress  Skin: No significant lesion seen.  Eyes/nose/throat: Eyes without scleral icterus, eye movements normal, pupils equal and reactive, oropharynx clear,   + Bilateral earwax  MSK: Neck with good ROM  Lymphatic: Neck without adenopathy or masses  Endocrine: Thyroid with no nodules to palpation  Pulm: Lungs clear to auscultation bilaterally  Cardiac: Heart with regular rate and rhythm, no murmur or gallop  GI: Obese, Abdomen soft, nontender. No palpable enlargement of liver or spleen  MSK: Extremities no tenderness or edema  Neuro: Moves all extremities, without focal weakness  Psych: Alert, normal mental status. Normal affect and speech  Prostate normal size, smooth, no nodules    ASSESSMENT/PLAN:     Annual preventive visit, Jose is doing well since her last meeting in the spring 2021.  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    I renewed his prescription for triamterene hydrochlorothiazide for blood pressure, sumatriptan for migraines, and also issuing a prescription for generic sildenafil for ED    COVID-19 infection, symptoms onset Monday, September 19, while he was traveling in [place], and then he flew back home on Friday, September 23, then he had a positive home antigen test on Saturday, September 24, symptoms were cough and head cold, symptoms, also loss of smell, and as of today September 30 he reports that he feels almost back to normal except little bit of head pressure.    He did not take any antiviral medication.  COVID-19,PF,Pfizer (12+ Yrs) 03/19/2021, 04/09/2021, 12/23/2021   COVID-19,PF,Pfizer 12+ Yrs (2022 and After) 08/17/2022     I told Jose that he should hold off on the bivalent booster for at least 2 months, maybe 3, and that he probably will have at least several months of immunity against omicron    Hypertension in the context of obesity and obstructive sleep apnea, blood pressure appears to be well controlled on triamterene hydrochlorothiazide, history of ACE inhibitor cough.    He owns a home blood pressure machine, and I want him to keep using that.  Target is 120/80 measured at rest in seated position.  Losing weight will help reduce his dependence on blood pressure medication.  His kidney function historically has been normal, will recheck that on this round of testing.  I think triamterene hydrochlorothiazide is fine to continue.    BP Readings from Last 6 Encounters:   09/30/22 118/80   04/09/21 128/83   01/31/20 118/84      Anxiety with OCD tendencies, history of  depression, but symptoms have been under decent control since stopping bupropion in May 2020.      He had more significant depression symptoms in his 30s.  Nowadays he manages with cognitive behavioral therapy type of techniques.  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  He Once upon a time use Celexa.      Alcohol consumption sounds modest, about a beer a day, although that is a source of calories.     Migraine/vascular headaches, for which sumatriptan and has worked as abortive therapy, although he does consume a lot of caffeine.      Obstructive sleep apnea, needs renewal of his CPAP supplies.       Obesity with body mass index of 35.9, I really would like to see him lose 25 pounds this year.   I reminded him about the importance of eating slowly, controlling portion size, and identifying problem foods to curtail or eliminate.  He recently purchased a Nordic track exercise bike.     I wanted to get at least 30 minutes of vigorous physical activity every day.  That will also help quality of sleep and blood pressure.  I reminded him about the carbohydrate calorie content of beer.    Wt Readings from Last 5 Encounters:   09/30/22 133.8 kg (295 lb)   04/09/21 139.7 kg (308 lb)   01/31/20 133 kg (293 lb 1.9 oz)   05/15/19 129.7 kg (286 lb)   05/30/18 129.7 kg (286 lb 0.6 oz)     History of left knee meniscus surgery and lateral collateral ligament repair in 1980s, he has implemented strength training to help stabilize his knee.       Status post bilateral inguinal repair surgery 2018.      Benign prostatic hyperplasia symptoms with nighttime urination about 2-3.    I told him we could consider medication to improve BPH symptoms.  If he wants to try medication, start with the alpha-blocker tamsulosin 0.4 mg once a day.  Another medication To consider is finasteride, which gradually shrink the prostate gland over the course of a year by  "blocking the action of testosterone on prostate gland tissue.  Tamsulosin and finasteride are often combined together.  We will check a screening PSA on this round of testing.    Erectile dysfunction, common type  Will have him try generic sildenafil 20 mg tablet, start with 2 tablets, taken 1 hour prior to activity, maximum one dose in 24 hours.  He can find what his eventual doses, max will be five 20 mg tablets equals 100 mg  I told him about potential side effects of low blood pressure, flushing, nausea, diarrhea, same as what would be listed on the Viagra manufactures website.    Bilateral earwax, I reminded Jose to use over-the-counter wax dissolving eardrops, example brand Debrox or Murine     Due for age-appropriate screening colonoscopy, which is ordered for him.       Immunization History   Administered Date(s) Administered     COVID-19,PF,Pfizer (12+ Yrs) 03/19/2021, 04/09/2021, 12/23/2021     COVID-19,PF,Pfizer 12+ Yrs (2022 and After) 08/17/2022     Flu, Unspecified 10/10/2021     Influenza (IIV3) PF 10/11/2005     Influenza Vaccine IM > 6 months Valent IIV4 (Alfuria,Fluzone) 09/11/2020     Influenza,INJ,MDCK,PF,Quad >6mo(Flucelvax-RMG) 09/02/2022     TD (ADULT, 7+) 10/11/2005     Tdap (Adacel,Boostrix) 04/01/2016     Zoster vaccine recombinant adjuvanted (SHINGRIX) 01/31/2020, 04/26/2021         COUNSELING:   Reviewed preventive health counseling, as reflected in patient instructions       Healthy diet/nutrition    Estimated body mass index is 35.91 kg/m  as calculated from the following:    Height as of this encounter: 1.93 m (6' 4\").    Weight as of this encounter: 133.8 kg (295 lb).     Weight management plan: Discussed healthy diet and exercise guidelines    He reports that he has never smoked. He has never used smokeless tobacco.        TAHIRA SOLOMON MD  [facility]  "

## 2022-10-05 ENCOUNTER — LAB (OUTPATIENT)
Dept: LAB | Facility: CLINIC | Age: 57
End: 2022-10-05
Payer: COMMERCIAL

## 2022-10-05 DIAGNOSIS — Z00.00 ROUTINE GENERAL MEDICAL EXAMINATION AT A HEALTH CARE FACILITY: ICD-10-CM

## 2022-10-05 DIAGNOSIS — E66.01 MORBID OBESITY (H): ICD-10-CM

## 2022-10-05 DIAGNOSIS — Z12.5 SCREENING PSA (PROSTATE SPECIFIC ANTIGEN): ICD-10-CM

## 2022-10-05 DIAGNOSIS — Z13.1 SCREENING FOR DIABETES MELLITUS: ICD-10-CM

## 2022-10-05 LAB
ALBUMIN SERPL BCG-MCNC: 4.3 G/DL (ref 3.5–5.2)
ALP SERPL-CCNC: 77 U/L (ref 40–129)
ALT SERPL W P-5'-P-CCNC: 24 U/L (ref 10–50)
ANION GAP SERPL CALCULATED.3IONS-SCNC: 10 MMOL/L (ref 7–15)
AST SERPL W P-5'-P-CCNC: 24 U/L (ref 10–50)
BILIRUB SERPL-MCNC: 0.6 MG/DL
BUN SERPL-MCNC: 18.1 MG/DL (ref 6–20)
CALCIUM SERPL-MCNC: 9.3 MG/DL (ref 8.6–10)
CHLORIDE SERPL-SCNC: 101 MMOL/L (ref 98–107)
CHOLEST SERPL-MCNC: 143 MG/DL
CREAT SERPL-MCNC: 0.98 MG/DL (ref 0.67–1.17)
DEPRECATED HCO3 PLAS-SCNC: 27 MMOL/L (ref 22–29)
ERYTHROCYTE [DISTWIDTH] IN BLOOD BY AUTOMATED COUNT: 12 % (ref 10–15)
GFR SERPL CREATININE-BSD FRML MDRD: >90 ML/MIN/1.73M2
GLUCOSE SERPL-MCNC: 107 MG/DL (ref 70–99)
HBA1C MFR BLD: 5.5 % (ref 0–5.6)
HCT VFR BLD AUTO: 47.3 % (ref 40–53)
HDLC SERPL-MCNC: 47 MG/DL
HGB BLD-MCNC: 15.7 G/DL (ref 13.3–17.7)
LDLC SERPL CALC-MCNC: 80 MG/DL
MCH RBC QN AUTO: 30.8 PG (ref 26.5–33)
MCHC RBC AUTO-ENTMCNC: 33.2 G/DL (ref 31.5–36.5)
MCV RBC AUTO: 93 FL (ref 78–100)
NONHDLC SERPL-MCNC: 96 MG/DL
PLATELET # BLD AUTO: 298 10E3/UL (ref 150–450)
POTASSIUM SERPL-SCNC: 4.2 MMOL/L (ref 3.4–5.3)
PROT SERPL-MCNC: 7.1 G/DL (ref 6.4–8.3)
PSA SERPL-MCNC: 0.38 NG/ML (ref 0–3.5)
RBC # BLD AUTO: 5.1 10E6/UL (ref 4.4–5.9)
SODIUM SERPL-SCNC: 138 MMOL/L (ref 136–145)
TRIGL SERPL-MCNC: 82 MG/DL
TSH SERPL DL<=0.005 MIU/L-ACNC: 1.28 UIU/ML (ref 0.3–4.2)
WBC # BLD AUTO: 9 10E3/UL (ref 4–11)

## 2022-10-05 PROCEDURE — 84443 ASSAY THYROID STIM HORMONE: CPT

## 2022-10-05 PROCEDURE — G0103 PSA SCREENING: HCPCS

## 2022-10-05 PROCEDURE — 83036 HEMOGLOBIN GLYCOSYLATED A1C: CPT

## 2022-10-05 PROCEDURE — 80061 LIPID PANEL: CPT

## 2022-10-05 PROCEDURE — 36415 COLL VENOUS BLD VENIPUNCTURE: CPT

## 2022-10-05 PROCEDURE — 85027 COMPLETE CBC AUTOMATED: CPT

## 2022-10-05 PROCEDURE — 80053 COMPREHEN METABOLIC PANEL: CPT

## 2023-10-12 DIAGNOSIS — I10 ESSENTIAL HYPERTENSION, BENIGN: ICD-10-CM

## 2023-10-12 DIAGNOSIS — N52.9 ERECTILE DYSFUNCTION, UNSPECIFIED ERECTILE DYSFUNCTION TYPE: ICD-10-CM

## 2023-10-12 RX ORDER — SILDENAFIL CITRATE 20 MG/1
TABLET ORAL
Qty: 15 TABLET | Refills: 0 | Status: SHIPPED | OUTPATIENT
Start: 2023-10-12 | End: 2023-11-13

## 2023-10-12 RX ORDER — TRIAMTERENE AND HYDROCHLOROTHIAZIDE 37.5; 25 MG/1; MG/1
1 CAPSULE ORAL DAILY
Qty: 90 CAPSULE | Refills: 0 | Status: SHIPPED | OUTPATIENT
Start: 2023-10-12 | End: 2023-11-13

## 2023-10-20 ENCOUNTER — IMMUNIZATION (OUTPATIENT)
Dept: NURSING | Facility: CLINIC | Age: 58
End: 2023-10-20
Payer: COMMERCIAL

## 2023-10-20 PROCEDURE — 90480 ADMN SARSCOV2 VAC 1/ONLY CMP: CPT

## 2023-10-20 PROCEDURE — 90471 IMMUNIZATION ADMIN: CPT

## 2023-10-20 PROCEDURE — 91320 SARSCV2 VAC 30MCG TRS-SUC IM: CPT

## 2023-10-20 PROCEDURE — 90682 RIV4 VACC RECOMBINANT DNA IM: CPT

## 2023-11-12 ASSESSMENT — ENCOUNTER SYMPTOMS
NAUSEA: 0
HEADACHES: 0
SORE THROAT: 0
ARTHRALGIAS: 0
FEVER: 0
HEARTBURN: 0
WEAKNESS: 0
JOINT SWELLING: 0
COUGH: 0
FREQUENCY: 1
CONSTIPATION: 0
CHILLS: 0
HEMATOCHEZIA: 0
PARESTHESIAS: 0
HEMATURIA: 0
DYSURIA: 1
ABDOMINAL PAIN: 0
NERVOUS/ANXIOUS: 1
SHORTNESS OF BREATH: 0
EYE PAIN: 0
MYALGIAS: 1
PALPITATIONS: 0
DIZZINESS: 0
DIARRHEA: 0

## 2023-11-13 ENCOUNTER — OFFICE VISIT (OUTPATIENT)
Dept: FAMILY MEDICINE | Facility: CLINIC | Age: 58
End: 2023-11-13
Payer: COMMERCIAL

## 2023-11-13 VITALS
HEIGHT: 76 IN | SYSTOLIC BLOOD PRESSURE: 130 MMHG | TEMPERATURE: 98.2 F | OXYGEN SATURATION: 96 % | WEIGHT: 185.5 LBS | BODY MASS INDEX: 22.59 KG/M2 | DIASTOLIC BLOOD PRESSURE: 88 MMHG | HEART RATE: 67 BPM | RESPIRATION RATE: 12 BRPM

## 2023-11-13 DIAGNOSIS — R35.1 BENIGN PROSTATIC HYPERPLASIA WITH NOCTURIA: ICD-10-CM

## 2023-11-13 DIAGNOSIS — Z12.5 SCREENING FOR PROSTATE CANCER: ICD-10-CM

## 2023-11-13 DIAGNOSIS — F51.02 ADJUSTMENT INSOMNIA: ICD-10-CM

## 2023-11-13 DIAGNOSIS — Z00.00 ENCOUNTER FOR PREVENTATIVE ADULT HEALTH CARE EXAMINATION: Primary | ICD-10-CM

## 2023-11-13 DIAGNOSIS — I10 ESSENTIAL HYPERTENSION, BENIGN: ICD-10-CM

## 2023-11-13 DIAGNOSIS — G44.1 VASCULAR HEADACHE: ICD-10-CM

## 2023-11-13 DIAGNOSIS — G47.33 OSA (OBSTRUCTIVE SLEEP APNEA): ICD-10-CM

## 2023-11-13 DIAGNOSIS — H35.712 CENTRAL SEROUS RETINOPATHY, LEFT: ICD-10-CM

## 2023-11-13 DIAGNOSIS — N52.9 ERECTILE DYSFUNCTION, UNSPECIFIED ERECTILE DYSFUNCTION TYPE: ICD-10-CM

## 2023-11-13 DIAGNOSIS — N40.1 BENIGN PROSTATIC HYPERPLASIA WITH NOCTURIA: ICD-10-CM

## 2023-11-13 PROBLEM — E66.01 MORBID OBESITY (H): Status: RESOLVED | Noted: 2021-04-09 | Resolved: 2023-11-13

## 2023-11-13 LAB
ALBUMIN UR-MCNC: NEGATIVE MG/DL
ANION GAP SERPL CALCULATED.3IONS-SCNC: 11 MMOL/L (ref 7–15)
APPEARANCE UR: CLEAR
BILIRUB UR QL STRIP: NEGATIVE
BUN SERPL-MCNC: 19.4 MG/DL (ref 6–20)
CALCIUM SERPL-MCNC: 9.3 MG/DL (ref 8.6–10)
CHLORIDE SERPL-SCNC: 102 MMOL/L (ref 98–107)
CHOLEST SERPL-MCNC: 144 MG/DL
COLOR UR AUTO: YELLOW
CREAT SERPL-MCNC: 1 MG/DL (ref 0.67–1.17)
DEPRECATED HCO3 PLAS-SCNC: 23 MMOL/L (ref 22–29)
EGFRCR SERPLBLD CKD-EPI 2021: 89 ML/MIN/1.73M2
GLUCOSE SERPL-MCNC: 101 MG/DL (ref 70–99)
GLUCOSE UR STRIP-MCNC: NEGATIVE MG/DL
HDLC SERPL-MCNC: 55 MG/DL
HGB UR QL STRIP: NEGATIVE
KETONES UR STRIP-MCNC: NEGATIVE MG/DL
LDLC SERPL CALC-MCNC: 77 MG/DL
LEUKOCYTE ESTERASE UR QL STRIP: NEGATIVE
NITRATE UR QL: NEGATIVE
NONHDLC SERPL-MCNC: 89 MG/DL
PH UR STRIP: 5.5 [PH] (ref 5–8)
POTASSIUM SERPL-SCNC: 4.1 MMOL/L (ref 3.4–5.3)
PSA SERPL DL<=0.01 NG/ML-MCNC: 0.41 NG/ML (ref 0–3.5)
SODIUM SERPL-SCNC: 136 MMOL/L (ref 135–145)
SP GR UR STRIP: 1.01 (ref 1–1.03)
TRIGL SERPL-MCNC: 58 MG/DL
UROBILINOGEN UR STRIP-ACNC: 0.2 E.U./DL

## 2023-11-13 PROCEDURE — 80061 LIPID PANEL: CPT | Performed by: STUDENT IN AN ORGANIZED HEALTH CARE EDUCATION/TRAINING PROGRAM

## 2023-11-13 PROCEDURE — 99214 OFFICE O/P EST MOD 30 MIN: CPT | Mod: 25 | Performed by: STUDENT IN AN ORGANIZED HEALTH CARE EDUCATION/TRAINING PROGRAM

## 2023-11-13 PROCEDURE — 36415 COLL VENOUS BLD VENIPUNCTURE: CPT | Performed by: STUDENT IN AN ORGANIZED HEALTH CARE EDUCATION/TRAINING PROGRAM

## 2023-11-13 PROCEDURE — 99396 PREV VISIT EST AGE 40-64: CPT | Performed by: STUDENT IN AN ORGANIZED HEALTH CARE EDUCATION/TRAINING PROGRAM

## 2023-11-13 PROCEDURE — 81003 URINALYSIS AUTO W/O SCOPE: CPT | Performed by: STUDENT IN AN ORGANIZED HEALTH CARE EDUCATION/TRAINING PROGRAM

## 2023-11-13 PROCEDURE — G0103 PSA SCREENING: HCPCS | Performed by: STUDENT IN AN ORGANIZED HEALTH CARE EDUCATION/TRAINING PROGRAM

## 2023-11-13 PROCEDURE — 80048 BASIC METABOLIC PNL TOTAL CA: CPT | Performed by: STUDENT IN AN ORGANIZED HEALTH CARE EDUCATION/TRAINING PROGRAM

## 2023-11-13 RX ORDER — TAMSULOSIN HYDROCHLORIDE 0.4 MG/1
0.4 CAPSULE ORAL DAILY
Qty: 90 CAPSULE | Refills: 3 | Status: SHIPPED | OUTPATIENT
Start: 2023-11-13

## 2023-11-13 RX ORDER — OYSTER SHELL CALCIUM WITH VITAMIN D 500; 200 MG/1; [IU]/1
1 TABLET, FILM COATED ORAL DAILY
Qty: 90 TABLET | Refills: 3 | Status: SHIPPED | OUTPATIENT
Start: 2023-11-13

## 2023-11-13 RX ORDER — SUMATRIPTAN 100 MG/1
100 TABLET, FILM COATED ORAL
Qty: 30 TABLET | Refills: 1 | Status: SHIPPED | OUTPATIENT
Start: 2023-11-13

## 2023-11-13 RX ORDER — SILDENAFIL CITRATE 20 MG/1
TABLET ORAL
Qty: 15 TABLET | Refills: 3 | Status: SHIPPED | OUTPATIENT
Start: 2023-11-13 | End: 2024-03-15

## 2023-11-13 RX ORDER — ASPIRIN 81 MG/1
81 TABLET ORAL DAILY
Status: CANCELLED | OUTPATIENT
Start: 2023-11-13

## 2023-11-13 RX ORDER — TRIAMTERENE AND HYDROCHLOROTHIAZIDE 37.5; 25 MG/1; MG/1
1 CAPSULE ORAL DAILY
Qty: 90 CAPSULE | Refills: 3 | Status: SHIPPED | OUTPATIENT
Start: 2023-11-13 | End: 2024-05-31 | Stop reason: ALTCHOICE

## 2023-11-13 RX ORDER — DOXEPIN HYDROCHLORIDE 10 MG/1
10 CAPSULE ORAL AT BEDTIME
Qty: 30 CAPSULE | Refills: 3 | Status: SHIPPED | OUTPATIENT
Start: 2023-11-13 | End: 2024-05-31

## 2023-11-13 RX ORDER — MELATONIN 3 MG
3 TABLET ORAL 3 TIMES DAILY
COMMUNITY
End: 2024-05-31

## 2023-11-13 RX ORDER — ASCORBIC ACID 500 MG
500 TABLET ORAL DAILY
Qty: 90 TABLET | Refills: 3 | Status: SHIPPED | OUTPATIENT
Start: 2023-11-13

## 2023-11-13 ASSESSMENT — ENCOUNTER SYMPTOMS
DYSURIA: 1
PALPITATIONS: 0
HEMATURIA: 0
HEARTBURN: 0
FREQUENCY: 1
FEVER: 0
ARTHRALGIAS: 0
WEAKNESS: 0
SORE THROAT: 0
DIARRHEA: 0
PARESTHESIAS: 0
MYALGIAS: 1
SHORTNESS OF BREATH: 0
NAUSEA: 0
EYE PAIN: 0
DIZZINESS: 0
HEMATOCHEZIA: 0
HEADACHES: 0
CHILLS: 0
CONSTIPATION: 0
JOINT SWELLING: 0
ABDOMINAL PAIN: 0
NERVOUS/ANXIOUS: 1
COUGH: 0

## 2023-11-13 NOTE — PROGRESS NOTES
SUBJECTIVE:   CC: Jose is an 57 year old who presents for preventative health visit.       11/13/2023     9:21 AM   Additional Questions   Roomed by [facility] [place]       Healthy Habits:     Getting at least 3 servings of Calcium per day:  Yes    Bi-annual eye exam:  Yes    Dental care twice a year:  Yes    Sleep apnea or symptoms of sleep apnea:  Sleep apnea    Diet:  Other    Frequency of exercise:  4-5 days/week    Duration of exercise:  45-60 minutes    Taking medications regularly:  Yes    Medication side effects:  None    Additional concerns today:  Yes    \    Has colonoscopy appointment in Jan 2024.       Today's PHQ-2 Score:       11/12/2023    10:10 AM   PHQ-2 ( 1999 [employer])   Q1: Little interest or pleasure in doing things 0   Q2: Feeling down, depressed or hopeless 0   PHQ-2 Score 0   Q1: Little interest or pleasure in doing things Not at all   Q2: Feeling down, depressed or hopeless Not at all   PHQ-2 Score 0                       Social History     Tobacco Use    Smoking status: Never     Passive exposure: Never    Smokeless tobacco: Never   Substance Use Topics    Alcohol use: Yes             11/12/2023    10:10 AM   Alcohol Use   Prescreen: >3 drinks/day or >7 drinks/week? No       Last PSA:   Prostate Specific Antigen Screen   Date Value Ref Range Status   10/05/2022 0.38 0.00 - 3.50 ng/mL Final   04/26/2021 0.4 0.0 - 3.5 ng/mL Final       Reviewed orders with patient. Reviewed health maintenance and updated orders accordingly - Yes  BP Readings from Last 3 Encounters:   11/13/23 130/88   09/30/22 118/80   04/09/21 128/83    Wt Readings from Last 3 Encounters:   11/13/23 84.1 kg (185 lb 8 oz)   09/30/22 133.8 kg (295 lb)   04/09/21 139.7 kg (308 lb)                  Patient Active Problem List   Diagnosis    Obesity (BMI 30-39.9)    Essential hypertension, benign    GARCÍA (obstructive sleep apnea)    Migraines    Obesity (BMI 35.0-39.9) with comorbidity (H)    Anxiety    Benign prostatic hyperplasia with  nocturia    Erectile dysfunction, unspecified erectile dysfunction type     Past Surgical History:   Procedure Laterality Date    ARTHROSCOPY KNEE Bilateral     OTHER SURGICAL HISTORY      PILONIDAL ABSCESS    OTHER SURGICAL HISTORY Bilateral 2018    Inguinal hernia       Social History     Tobacco Use    Smoking status: Never     Passive exposure: Never    Smokeless tobacco: Never   Substance Use Topics    Alcohol use: Yes     Family History   Problem Relation Age of Onset    Hypertension Mother     Hypertension Father     Glaucoma Father         1944    Heart Disease Other 40.00        FAMILY         Current Outpatient Medications   Medication Sig Dispense Refill    Calcium Carbonate-Vitamin D 500-5 MG-MCG TABS Take 1 tablet by mouth daily 90 tablet 3    doxepin (SINEQUAN) 10 MG capsule Take 1 capsule (10 mg) by mouth at bedtime 30 capsule 3    melatonin 3-10 MG TABS Take 3 mg by mouth 3 times daily      sildenafil (REVATIO) 20 MG tablet Take 1 tablet (20 mg) up to a maximum dose of 5 tablets (equals 100 mg) 1 hour prior to activity, maximum 1 dose in 24 hours. For erectile dysfunction. 15 tablet 3    SUMAtriptan (IMITREX) 100 MG tablet Take 1 tablet (100 mg) by mouth at onset of headache for migraine May repeat dose in 2 hours.  Maximum 2 tablets in 24 hours. 30 tablet 1    tamsulosin (FLOMAX) 0.4 MG capsule Take 1 capsule (0.4 mg) by mouth daily 90 capsule 3    triamterene-HCTZ (DYAZIDE) 37.5-25 MG capsule Take 1 capsule by mouth daily 90 capsule 3    vitamin C (ASCORBIC ACID) 500 MG tablet Take 1 tablet (500 mg) by mouth daily 90 tablet 3     Allergies   Allergen Reactions    Lisinopril Cough       Reviewed and updated as needed this visit by clinical staff   Tobacco  Allergies  Meds              Reviewed and updated as needed this visit by Provider                 Past Medical History:   Diagnosis Date    Chronic depression 11/25/2015    Depression     HOSPITALIZATION 1996    HTN (hypertension)      "Migraines     Nephrolithiasis 2008    Obesity (BMI 30-39.9)     BMI 36.8    OCD (obsessive compulsive disorder)     GARCÍA (obstructive sleep apnea)     moderate      Past Surgical History:   Procedure Laterality Date    ARTHROSCOPY KNEE Bilateral     OTHER SURGICAL HISTORY      PILONIDAL ABSCESS    OTHER SURGICAL HISTORY Bilateral 2018    Inguinal hernia       Review of Systems   Constitutional:  Negative for chills and fever.   HENT:  Negative for congestion, ear pain, hearing loss and sore throat.    Eyes:  Positive for visual disturbance. Negative for pain.   Respiratory:  Negative for cough and shortness of breath.    Cardiovascular:  Negative for chest pain, palpitations and peripheral edema.   Gastrointestinal:  Negative for abdominal pain, constipation, diarrhea, heartburn, hematochezia and nausea.   Genitourinary:  Positive for dysuria, frequency, impotence and urgency. Negative for genital sores, hematuria and penile discharge.   Musculoskeletal:  Positive for myalgias. Negative for arthralgias and joint swelling.   Skin:  Negative for rash.   Neurological:  Negative for dizziness, weakness, headaches and paresthesias.   Psychiatric/Behavioral:  Negative for mood changes. The patient is nervous/anxious.      Prostate Specific Antigen Screen   Date Value Ref Range Status   10/05/2022 0.38 0.00 - 3.50 ng/mL Final   04/26/2021 0.4 0.0 - 3.5 ng/mL Final           OBJECTIVE:   /88 (BP Location: Right arm, Patient Position: Sitting, Cuff Size: Adult Large)   Pulse 67   Temp 98.2  F (36.8  C) (Oral)   Resp 12   Ht 1.918 m (6' 3.5\")   Wt 84.1 kg (185 lb 8 oz)   SpO2 96%   BMI 22.88 kg/m      Physical Exam  GENERAL: healthy, alert and no distress  EYES: Eyes grossly normal to inspection, PERRL and conjunctivae and sclerae normal  HENT: ear canals and TM's normal, nose and mouth without ulcers or lesions  NECK: no adenopathy, no asymmetry, masses, or scars and thyroid normal to palpation  RESP: lungs clear " to auscultation - no rales, rhonchi or wheezes  CV: regular rate and rhythm, normal S1 S2, no S3 or S4, no murmur, click or rub, no peripheral edema and peripheral pulses strong  ABDOMEN: soft, nontender, no hepatosplenomegaly, no masses and bowel sounds normal  MS: no gross musculoskeletal defects noted, no edema  SKIN: no suspicious lesions or rashes  NEURO: Normal strength and tone, mentation intact and speech normal  PSYCH: mentation appears normal, affect normal/bright    Diagnostic Test Results:  Labs reviewed in Epic    ASSESSMENT/PLAN:   Jose was seen today for physical.    Diagnoses and all orders for this visit:    Encounter for preventative adult health care examination    Jose is a 57-year-old male who presents today for adult wellness check, has several issues which are discussed further below.  After discussion does wish to continue with prostate cancer screening with PSA, last PSA screening was done in 2022 with normal results.  PSA screening ordered today.  Patient reports colonoscopy is already scheduled for January 2024, encourage patient to keep this appointment as he does require colon cancer screening with his last colonoscopy being in 2010.  He is up-to-date on vaccinations.  Does prefer to continue taking supplements, vitamin C vitamin D and calcium.  Did recommend discontinuing aspirin as this was taken for primary prevention and not for any other cause, patient agrees after discussion of risks and benefits of aspirin use.  Recommend screening for hyperlipidemia as well as diabetes, patient is fasting today, patient agrees orders placed.      -     vitamin C (ASCORBIC ACID) 500 MG tablet; Take 1 tablet (500 mg) by mouth daily  -     Calcium Carbonate-Vitamin D 500-5 MG-MCG TABS; Take 1 tablet by mouth daily  -     PSA, screen; Future  -     Lipid panel reflex to direct LDL Fasting; Future    Erectile dysfunction, unspecified erectile dysfunction type    Well-controlled on sildenafil, continue  current medication.    -     sildenafil (REVATIO) 20 MG tablet; Take 1 tablet (20 mg) up to a maximum dose of 5 tablets (equals 100 mg) 1 hour prior to activity, maximum 1 dose in 24 hours. For erectile dysfunction.    Vascular headache    Reports infrequent headaches, which are well treated with Imitrex, these headaches occur on average less than once monthly.  Continue Imitrex.  Patient is to notify us if having change in his headache.    -     SUMAtriptan (IMITREX) 100 MG tablet; Take 1 tablet (100 mg) by mouth at onset of headache for migraine May repeat dose in 2 hours.  Maximum 2 tablets in 24 hours.    Essential hypertension, benign    Well-controlled on Dyazide, continue current medication.  We will repeat lab work to assess kidney function and electrolyte status.    -     triamterene-HCTZ (DYAZIDE) 37.5-25 MG capsule; Take 1 capsule by mouth daily  -     Basic metabolic panel; Future    GARCÍA (obstructive sleep apnea)    Reports compliance with CPAP, will write for supplies for patient.  -     CPAP Order for DME - ONLY FOR DME    Screening for prostate cancer    Benign prostatic hyperplasia with nocturia    Urinating 3-4 times nightly, and reports urgency during the daytime as well with incomplete voiding and straining.  Although review of systems put dysuria, patient reports there is no pain with urination.  He does feels like he needs to strain.  This is worse than he reported last year, at his annual visit reported 2-3 nocturia episodes nightly at that time.  We discussed treatment, he does wish to attempt Flomax, I ordered this for the patient after describing the side effects and risks of taking this medication.  He will follow-up in 1 month if not improving, would recommend for increasing dose to 0.8 mg nightly.  We will check urinalysis.    -     UA Macroscopic with reflex to Microscopic and Culture - Lab Collect; Future  -     tamsulosin (FLOMAX) 0.4 MG capsule; Take 1 capsule (0.4 mg) by mouth  daily    Adjustment insomnia    Patient has a history of OCD and depression, reports that he had an increase in stress over the last 18 months during a business acquisition, and has noticed that as that stress has ended he has had a hard time going to sleep, with racing thoughts, sometimes difficulty going back to sleep.  He has attempted meditation, weighted blanket with some success, but still has some difficulty going to sleep.  After discussion with patient we will attempt doxepin 10 mg, to take 30 minutes prior to sleep, discussed side effects and benefits of taking the medication.  He is agreeable to attempting the medicine and will let me know if it is not working within the next month.      Previously was on Celexa for depression/anxiety.  Discussed with patient that we could attempt this treatment again, however he reports that daytime symptoms are manageable and does not want medication at this time for the symptoms.    -     doxepin (SINEQUAN) 10 MG capsule; Take 1 capsule (10 mg) by mouth at bedtime      Central serous retinopathy, left    Reports this has been due to stress, is being treated by ophthalmology, taking melatonin per their recommendations 3 times daily, he sees them again in December, recommend continued follow-up with ophthalmology for this issue.    Other orders  -     REVIEW OF HEALTH MAINTENANCE PROTOCOL ORDERS              COUNSELING:   Reviewed preventive health counseling, as reflected in patient instructions        He reports that he has never smoked. He has never been exposed to tobacco smoke. He has never used smokeless tobacco.            Juan Pina MD  [facility]

## 2024-01-05 ENCOUNTER — TRANSFERRED RECORDS (OUTPATIENT)
Dept: HEALTH INFORMATION MANAGEMENT | Facility: CLINIC | Age: 59
End: 2024-01-05
Payer: COMMERCIAL

## 2024-03-15 DIAGNOSIS — N52.9 ERECTILE DYSFUNCTION, UNSPECIFIED ERECTILE DYSFUNCTION TYPE: ICD-10-CM

## 2024-03-18 RX ORDER — SILDENAFIL CITRATE 20 MG/1
TABLET ORAL
Qty: 15 TABLET | Refills: 3 | Status: SHIPPED | OUTPATIENT
Start: 2024-03-18 | End: 2024-08-18

## 2024-05-11 ENCOUNTER — TRANSFERRED RECORDS (OUTPATIENT)
Dept: HEALTH INFORMATION MANAGEMENT | Facility: CLINIC | Age: 59
End: 2024-05-11

## 2024-05-31 ENCOUNTER — OFFICE VISIT (OUTPATIENT)
Dept: INTERNAL MEDICINE | Facility: CLINIC | Age: 59
End: 2024-05-31
Payer: COMMERCIAL

## 2024-05-31 VITALS
WEIGHT: 306 LBS | DIASTOLIC BLOOD PRESSURE: 82 MMHG | HEART RATE: 73 BPM | BODY MASS INDEX: 37.26 KG/M2 | RESPIRATION RATE: 16 BRPM | OXYGEN SATURATION: 97 % | SYSTOLIC BLOOD PRESSURE: 134 MMHG | TEMPERATURE: 98.6 F | HEIGHT: 76 IN

## 2024-05-31 DIAGNOSIS — E66.01 MORBID OBESITY (H): ICD-10-CM

## 2024-05-31 DIAGNOSIS — I10 ESSENTIAL HYPERTENSION, BENIGN: ICD-10-CM

## 2024-05-31 DIAGNOSIS — R55 SYNCOPE, UNSPECIFIED SYNCOPE TYPE: Primary | ICD-10-CM

## 2024-05-31 DIAGNOSIS — G47.33 OSA (OBSTRUCTIVE SLEEP APNEA): ICD-10-CM

## 2024-05-31 DIAGNOSIS — R07.89 ATYPICAL CHEST PAIN: ICD-10-CM

## 2024-05-31 PROCEDURE — 99215 OFFICE O/P EST HI 40 MIN: CPT | Performed by: INTERNAL MEDICINE

## 2024-05-31 PROCEDURE — G2211 COMPLEX E/M VISIT ADD ON: HCPCS | Performed by: INTERNAL MEDICINE

## 2024-05-31 RX ORDER — LOSARTAN POTASSIUM 100 MG/1
100 TABLET ORAL DAILY
Qty: 90 TABLET | Refills: 3 | Status: SHIPPED | OUTPATIENT
Start: 2024-05-31

## 2024-05-31 RX ORDER — ASPIRIN 81 MG/1
81 TABLET ORAL DAILY
COMMUNITY

## 2024-05-31 NOTE — PATIENT INSTRUCTIONS
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    Because Jose experienced some upper abdominal pressure, I think is worth doing a cardiac stress test, even though I believe that symptom is more likely the gastric or lower esophageal    Lets also get a 30-day cardiac event monitor    And will also change his blood pressure medicine from triamterene hydrochlorothiazide to losartan 100 mg a day    And also ask him to reduce the dose of sildenafil to a single 20 mg tablet    3 beers followed by fish and chips  May 11, 2024,passing out. He was at a restaurant with his family when he stood up and became very lightheaded and nauseous. He also had a fullness sensation in his upper abdomen. He tried to  walk it off  and fully passed out. Bystanders around caught him so he did not injure himself when he fell. He was out for several minutes while his wife called 911.    There was been no evidence of arrhythmia on his EKG or cardiac monitoring over the past 3-1/2 hours      05/12/2024  Sinus rhythm  Minimal voltage criteria for LVH, may be normal variant  Cannot rule out Anterior infarct , age undetermined , though low voltage V3 may be secondary to poor electrode contact  Abnormal ECG  No previous ECGs available  Confirmed by Rod Lua ([id number]) on 5/12/2024 6:27:44 PM     EXAM: XR CHEST 2 VIEWS  LOCATION: [facility]  DATE: 5/11/2024  INDICATION: SOB, SOB  IMPRESSION: Negative chest.     Obesity with body mass index almost 38, approaching the morbidly obese threshold of 40  Although Jose  does not have a history of diabetes, I think he be an excellent candidate to try metformin which has GLP-1 like effects and might produce meaningful weight loss    Will start metformin 500 mg twice a day, taken with food  Potential side effects of stomach upset, may be diarrhea, but does tend to get better with persistence on the drug    He has normal kidney and liver function    In addition to the metformin, Jose is going to focus on lifestyle improvement measures.  He is in the habit of bringing his lunch to work so that is actually an opportunity to bring only healthy foods.  Stay away from restaurants.  Eat slower.  Steer clear of sugary and starchy foods.  He can have all the vegetables he wants  Moderate alcohol intake to at most 1 serving a day which might be a beer or small glass of wine  Get some sort of physical activity every day, such as a brisk walk of 2 miles    Possibility of gastroesophageal reflux disease  Jose could try one of the acid reducing over-the-counter pill such as Prilosec OTC or Pepcid.  But I think the most important thing is being sure to avoid lying down when his stomach is full.  He should leave at least 2 hours for the stomach to empty.  He might also realize that certain foods make his reflux worse.    Losing weight will also help    History COVID-19 infection, symptoms onset September 2023  while he was traveling in [place]    I told Jose that he should hold off on the bivalent booster for at least 2 months, maybe 3, and that he probably will have at least several months of immunity against omicron     Hypertension in the context of obesity and obstructive sleep apnea  Considering his syncopal spell of May 11, 2024, lets try switching his blood pressure medicine from triamterene hydrochlorothiazide to  Start losartan 100 mg daily  history of ACE inhibitor cough.      He owns a home blood pressure machine, and I want him to keep using that.  Target is 120/80 measured at rest in  seated position.  Losing weight will help reduce his dependence on blood pressure medication.  His kidney function historically has been normal, will recheck that on this round of testing.  I think triamterene hydrochlorothiazide is fine to continue.     Wt Readings from Last 5 Encounters:   05/31/24 138.8 kg (306 lb)   11/13/23 84.1 kg (185 lb 8 oz)   09/30/22 133.8 kg (295 lb)   04/09/21 139.7 kg (308 lb)   01/31/20 133 kg (293 lb 1.9 oz)     Anxiety with OCD tendencies, history of depression, but symptoms have been under decent control since stopping bupropion in May 2020.    He had more significant depression symptoms in his 30s.  Nowadays he manages with cognitive behavioral therapy type of techniques.  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  He Once upon a time use Celexa.       Alcohol consumption sounds modest, about a beer a day, although that is a source of calories.     Migraine/vascular headaches, for which sumatriptan and has worked as abortive therapy, although he does consume a lot of caffeine.     Obstructive sleep apnea, uses CPAP every night  Jose struggling a bit with the CPAP equipment, using it may be 4 hours a night.  His CPAP machine is supposed to transmit data, but Jose says it is having trouble sinking up to the .    Will place a consultation request for him to return to [facility] sleep clinic for follow-up and problem solving    Obesity with body mass index of 38  Approaching morbid obesity levels  I reminded him about the importance of eating slowly, controlling portion size, and identifying problem foods to curtail or eliminate.  He recently purchased a Nordic track exercise bike.      I wanted to get at least 30 minutes of vigorous physical activity every day.  That will also help quality of sleep and blood pressure.  I reminded him about the carbohydrate calorie content of beer.     Wt Readings  from Last 5 Encounters:   05/31/24 138.8 kg (306 lb)   11/13/23 84.1 kg (185 lb 8 oz)   09/30/22 133.8 kg (295 lb)   04/09/21 139.7 kg (308 lb)   01/31/20 133 kg (293 lb 1.9 oz)     History of left knee meniscus surgery and lateral collateral ligament repair in 1980s, he has implemented strength training to help stabilize his knee.      Status post bilateral inguinal repair surgery 2018     Benign prostatic hyperplasia symptoms with nighttime urination about 2-3  I told him we could consider medication to improve BPH symptoms.  If he wants to try medication, start with the alpha-blocker tamsulosin 0.4 mg once a day.  Another medication To consider is finasteride, which gradually shrink the prostate gland over the course of a year by blocking the action of testosterone on prostate gland tissue.  Tamsulosin and finasteride are often combined together.  We will check a screening PSA on this round of testing.     Erectile dysfunction, common type  Will have him try generic sildenafil 20 mg tablet, start with 2 tablets, taken 1 hour prior to activity, maximum one dose in 24 hours.  He can find what his eventual doses, max will be five 20 mg tablets equals 100 mg  I told him about potential side effects of low blood pressure, flushing, nausea, diarrhea, same as what would be listed on the Viagra manufactures website.     Bilateral earwax, I reminded Jose to use over-the-counter wax dissolving eardrops, example brand Debrox or Murine     Colonoscopy done January 2024, but I am having trouble finding the report  There may have been polyps, because Jose remembers that they told him to come back in 5 years

## 2024-05-31 NOTE — PROGRESS NOTES
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    Because Jose experienced some upper abdominal pressure, I think is worth doing a cardiac stress test, even though I believe that symptom is more likely the gastric or lower esophageal    Lets also get a 30-day cardiac event monitor    And will also change his blood pressure medicine from triamterene hydrochlorothiazide to losartan 100 mg a day    And also ask him to reduce the dose of sildenafil to a single 20 mg tablet    3 beers followed by fish and chips  May 11, 2024,passing out. He was at a restaurant with his family when he stood up and became very lightheaded and nauseous. He also had a fullness sensation in his upper abdomen. He tried to  walk it off  and fully passed out. Bystanders around caught him so he did not injure himself when he fell. He was out for several minutes while his wife called 911.    There was been no evidence of arrhythmia on his EKG or cardiac monitoring over the past 3-1/2 hours      05/12/2024  Sinus rhythm  Minimal voltage criteria for LVH, may be normal variant  Cannot rule out Anterior infarct , age undetermined , though low voltage V3 may be secondary to poor electrode contact  Abnormal  ECG  No previous ECGs available  Confirmed by Rod Lua ([id number]) on 5/12/2024 6:27:44 PM     EXAM: XR CHEST 2 VIEWS  LOCATION: [facility]  DATE: 5/11/2024  INDICATION: SOB, SOB  IMPRESSION: Negative chest.     Obesity with body mass index almost 38, approaching the morbidly obese threshold of 40  Although Jsoe does not have a history of diabetes, I think he be an excellent candidate to try metformin which has GLP-1 like effects and might produce meaningful weight loss    Will start metformin 500 mg twice a day, taken with food  Potential side effects of stomach upset, may be diarrhea, but does tend to get better with persistence on the drug    He has normal kidney and liver function    In addition to the metformin, Jose is going to focus on lifestyle improvement measures.  He is in the habit of bringing his lunch to work so that is actually an opportunity to bring only healthy foods.  Stay away from restaurants.  Eat slower.  Steer clear of sugary and starchy foods.  He can have all the vegetables he wants  Moderate alcohol intake to at most 1 serving a day which might be a beer or small glass of wine  Get some sort of physical activity every day, such as a brisk walk of 2 miles    Possibility of gastroesophageal reflux disease  Jose could try one of the acid reducing over-the-counter pill such as Prilosec OTC or Pepcid.  But I think the most important thing is being sure to avoid lying down when his stomach is full.  He should leave at least 2 hours for the stomach to empty.  He might also realize that certain foods make his reflux worse.    Losing weight will also help    History COVID-19 infection, symptoms onset September 2023  while he was traveling in [place]    I told Jose that he should hold off on the bivalent booster for at least 2 months, maybe 3, and that he probably will have at least several months of immunity against omicron     Hypertension in the context of obesity and obstructive  sleep apnea  Considering his syncopal spell of May 11, 2024, lets try switching his blood pressure medicine from triamterene hydrochlorothiazide to  Start losartan 100 mg daily  history of ACE inhibitor cough.      He owns a home blood pressure machine, and I want him to keep using that.  Target is 120/80 measured at rest in seated position.  Losing weight will help reduce his dependence on blood pressure medication.  His kidney function historically has been normal, will recheck that on this round of testing.  I think triamterene hydrochlorothiazide is fine to continue.     Wt Readings from Last 5 Encounters:   05/31/24 138.8 kg (306 lb)   11/13/23 84.1 kg (185 lb 8 oz)   09/30/22 133.8 kg (295 lb)   04/09/21 139.7 kg (308 lb)   01/31/20 133 kg (293 lb 1.9 oz)     Anxiety with OCD tendencies, history of depression, but symptoms have been under decent control since stopping bupropion in May 2020.    He had more significant depression symptoms in his 30s.  Nowadays he manages with cognitive behavioral therapy type of techniques.  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  He Once upon a time use Celexa.       Alcohol consumption sounds modest, about a beer a day, although that is a source of calories.     Migraine/vascular headaches, for which sumatriptan and has worked as abortive therapy, although he does consume a lot of caffeine.     Obstructive sleep apnea, uses CPAP every night  Jose struggling a bit with the CPAP equipment, using it may be 4 hours a night.  His CPAP machine is supposed to transmit data, but Jose says it is having trouble sinking up to the .    Will place a consultation request for him to return to [facility] sleep clinic for follow-up and problem solving    Obesity with body mass index of 38  Approaching morbid obesity levels  I reminded him about the importance of eating slowly, controlling portion size,  and identifying problem foods to curtail or eliminate.  He recently purchased a Nordic track exercise bike.      I wanted to get at least 30 minutes of vigorous physical activity every day.  That will also help quality of sleep and blood pressure.  I reminded him about the carbohydrate calorie content of beer.     Wt Readings from Last 5 Encounters:   05/31/24 138.8 kg (306 lb)   11/13/23 84.1 kg (185 lb 8 oz)   09/30/22 133.8 kg (295 lb)   04/09/21 139.7 kg (308 lb)   01/31/20 133 kg (293 lb 1.9 oz)     History of left knee meniscus surgery and lateral collateral ligament repair in 1980s, he has implemented strength training to help stabilize his knee.      Status post bilateral inguinal repair surgery 2018     Benign prostatic hyperplasia symptoms with nighttime urination about 2-3  I told him we could consider medication to improve BPH symptoms.  If he wants to try medication, start with the alpha-blocker tamsulosin 0.4 mg once a day.  Another medication To consider is finasteride, which gradually shrink the prostate gland over the course of a year by blocking the action of testosterone on prostate gland tissue.  Tamsulosin and finasteride are often combined together.  We will check a screening PSA on this round of testing.     Erectile dysfunction, common type  Will have him try generic sildenafil 20 mg tablet, start with 2 tablets, taken 1 hour prior to activity, maximum one dose in 24 hours.  He can find what his eventual doses, max will be five 20 mg tablets equals 100 mg  I told him about potential side effects of low blood pressure, flushing, nausea, diarrhea, same as what would be listed on the Viagra manufactures website.     Bilateral earwax, I reminded Jose to use over-the-counter wax dissolving eardrops, example brand Debrox or Murine     Colonoscopy done January 2024, but I am having trouble finding the report  There may have been polyps, because Jose remembers that they told him to come back in 5  years        --------------------------------------------------------------------------------------------------------------------------  History of Present Illness  This 58 year old old     May 11, 2024,passing out. He was at a restaurant with his family when he stood up and became very lightheaded and nauseous. He also had a fullness sensation in his upper abdomen. He tried to  walk it off  and fully passed out. Bystanders around caught him so he did not injure himself when he fell. He was out for several minutes while his wife called 911.    There was been no evidence of arrhythmia on his EKG or cardiac monitoring over the past 3-1/2 hours      05/12/2024  Sinus rhythm  Minimal voltage criteria for LVH, may be normal variant  Cannot rule out Anterior infarct , age undetermined , though low voltage V3 may be secondary to poor electrode contact  Abnormal ECG  No previous ECGs available  Confirmed by Rod Lua ([id number]) on 5/12/2024 6:27:44 PM     EXAM: XR CHEST 2 VIEWS  LOCATION: [facility]  DATE: 5/11/2024  INDICATION: SOB, SOB  IMPRESSION: Negative chest.    Wt Readings from Last 3 Encounters:   05/31/24 138.8 kg (306 lb)   11/13/23 84.1 kg (185 lb 8 oz)   09/30/22 133.8 kg (295 lb)     BP Readings from Last 3 Encounters:   05/31/24 134/82   11/13/23 130/88   09/30/22 118/80       ---------------------------------------------------------------------------------------------------------------------------    Medications, Allergies, Social, and Problem List   MED REC REQUIRED  Post Medication Reconciliation Status: discharge medications reconciled and changed, per note/orders      Current Outpatient Medications   Medication Sig Dispense Refill    aspirin 81 MG EC tablet Take 81 mg by mouth daily      Calcium Carbonate-Vitamin D 500-5 MG-MCG TABS Take 1 tablet by mouth daily 90 tablet 3    sildenafil (REVATIO) 20 MG tablet Take 1 tablet (20 mg) up to a maximum dose of 5 tablets (equals 100 mg) 1 hour  "prior to activity, maximum 1 dose in 24 hours. For erectile dysfunction. 15 tablet 3    SUMAtriptan (IMITREX) 100 MG tablet Take 1 tablet (100 mg) by mouth at onset of headache for migraine May repeat dose in 2 hours.  Maximum 2 tablets in 24 hours. 30 tablet 1    tamsulosin (FLOMAX) 0.4 MG capsule Take 1 capsule (0.4 mg) by mouth daily 90 capsule 3    triamterene-HCTZ (DYAZIDE) 37.5-25 MG capsule Take 1 capsule by mouth daily 90 capsule 3    vitamin C (ASCORBIC ACID) 500 MG tablet Take 1 tablet (500 mg) by mouth daily 90 tablet 3    doxepin (SINEQUAN) 10 MG capsule Take 1 capsule (10 mg) by mouth at bedtime (Patient not taking: Reported on 5/31/2024) 30 capsule 3    melatonin 3-10 MG TABS Take 3 mg by mouth 3 times daily (Patient not taking: Reported on 5/31/2024)       Allergies   Allergen Reactions    Lisinopril Cough     Social History     Tobacco Use    Smoking status: Never     Passive exposure: Never    Smokeless tobacco: Never   Vaping Use    Vaping status: Never Used   Substance Use Topics    Alcohol use: Yes     Patient Active Problem List   Diagnosis    Obesity (BMI 30-39.9)    Essential hypertension, benign    GARCÍA (obstructive sleep apnea)    Migraines    Anxiety    Benign prostatic hyperplasia with nocturia    Erectile dysfunction, unspecified erectile dysfunction type        Reviewed, reconciled and updated       Physical Exam   General Appearance:       /82 (BP Location: Right arm, Patient Position: Sitting, Cuff Size: Adult Regular)   Pulse 73   Temp 98.6  F (37  C)   Resp 16   Ht 1.918 m (6' 3.5\")   Wt 138.8 kg (306 lb)   SpO2 97%   BMI 37.74 kg/m    Jose appears well today.  Mildly elevated blood pressure, but we are oging to change his blood pressure medicine  Lungs clear  Heart rhythm regular, no murmurs     Additional Information   I spent 40 minutes on this encounter, including reviewing interval history since last visit, examining the patient, explaining and counseling the issues " enumerated in the Assessment and Plan (patient given a copy), ordering indicated tests, ordering prescriptions, ordering referrals.    The longitudinal plan of care for the diagnosis(es)/condition(s) as documented were addressed during this visit. Due to the added complexity in care, I will continue to support Jose in the subsequent management and with ongoing continuity of care.       TAHIRA SOLOMON MD, MD      Signed Electronically by: TAHIRA SOLOMON MD

## 2024-06-06 ENCOUNTER — HOSPITAL ENCOUNTER (OUTPATIENT)
Dept: CARDIOLOGY | Facility: CLINIC | Age: 59
Discharge: HOME | End: 2024-06-06
Attending: INTERNAL MEDICINE | Admitting: INTERNAL MEDICINE
Payer: COMMERCIAL

## 2024-06-06 DIAGNOSIS — R55 SYNCOPE, UNSPECIFIED SYNCOPE TYPE: ICD-10-CM

## 2024-06-06 DIAGNOSIS — R07.89 ATYPICAL CHEST PAIN: ICD-10-CM

## 2024-06-06 PROCEDURE — 93270 REMOTE 30 DAY ECG REV/REPORT: CPT

## 2024-06-18 ENCOUNTER — HOSPITAL ENCOUNTER (OUTPATIENT)
Dept: NUCLEAR MEDICINE | Facility: HOSPITAL | Age: 59
Discharge: HOME | End: 2024-06-18
Attending: INTERNAL MEDICINE
Payer: COMMERCIAL

## 2024-06-18 ENCOUNTER — HOSPITAL ENCOUNTER (OUTPATIENT)
Dept: CARDIOLOGY | Facility: HOSPITAL | Age: 59
Discharge: HOME | End: 2024-06-18
Attending: INTERNAL MEDICINE
Payer: COMMERCIAL

## 2024-06-18 DIAGNOSIS — R07.89 ATYPICAL CHEST PAIN: ICD-10-CM

## 2024-06-18 DIAGNOSIS — R55 SYNCOPE, UNSPECIFIED SYNCOPE TYPE: ICD-10-CM

## 2024-06-18 PROCEDURE — A9500 TC99M SESTAMIBI: HCPCS | Performed by: INTERNAL MEDICINE

## 2024-06-18 PROCEDURE — 343N000001 HC RX 343: Performed by: INTERNAL MEDICINE

## 2024-06-18 PROCEDURE — 93018 CV STRESS TEST I&R ONLY: CPT | Performed by: INTERNAL MEDICINE

## 2024-06-18 PROCEDURE — 78452 HT MUSCLE IMAGE SPECT MULT: CPT

## 2024-06-18 PROCEDURE — 93016 CV STRESS TEST SUPVJ ONLY: CPT

## 2024-06-18 PROCEDURE — 78452 HT MUSCLE IMAGE SPECT MULT: CPT | Mod: 26 | Performed by: INTERNAL MEDICINE

## 2024-06-18 RX ADMIN — Medication 42.3 MILLICURIE: at 13:26

## 2024-06-19 ENCOUNTER — HOSPITAL ENCOUNTER (OUTPATIENT)
Dept: NUCLEAR MEDICINE | Facility: HOSPITAL | Age: 59
Discharge: HOME | End: 2024-06-19
Attending: INTERNAL MEDICINE
Payer: COMMERCIAL

## 2024-06-19 DIAGNOSIS — R94.39 ABNORMAL CARDIOVASCULAR STRESS TEST: Primary | ICD-10-CM

## 2024-06-19 LAB
CV STRESS CURRENT BP HE: NORMAL
CV STRESS CURRENT HR HE: 102
CV STRESS CURRENT HR HE: 103
CV STRESS CURRENT HR HE: 108
CV STRESS CURRENT HR HE: 109
CV STRESS CURRENT HR HE: 114
CV STRESS CURRENT HR HE: 116
CV STRESS CURRENT HR HE: 126
CV STRESS CURRENT HR HE: 127
CV STRESS CURRENT HR HE: 128
CV STRESS CURRENT HR HE: 131
CV STRESS CURRENT HR HE: 140
CV STRESS CURRENT HR HE: 140
CV STRESS CURRENT HR HE: 143
CV STRESS CURRENT HR HE: 144
CV STRESS CURRENT HR HE: 156
CV STRESS CURRENT HR HE: 157
CV STRESS CURRENT HR HE: 166
CV STRESS CURRENT HR HE: 67
CV STRESS CURRENT HR HE: 69
CV STRESS CURRENT HR HE: 87
CV STRESS CURRENT HR HE: 88
CV STRESS CURRENT HR HE: 88
CV STRESS CURRENT HR HE: 89
CV STRESS CURRENT HR HE: 92
CV STRESS CURRENT HR HE: 92
CV STRESS CURRENT HR HE: 94
CV STRESS CURRENT HR HE: 99
CV STRESS DEVIATION TIME HE: NORMAL
CV STRESS ECHO PERCENT HR HE: NORMAL
CV STRESS EXERCISE STAGE HE: NORMAL
CV STRESS EXERCISE STAGE REACHED HE: NORMAL
CV STRESS FINAL RESTING BP HE: NORMAL
CV STRESS FINAL RESTING HR HE: 87
CV STRESS MAX HR HE: 166
CV STRESS MAX TREADMILL GRADE HE: 14
CV STRESS MAX TREADMILL SPEED HE: 3.4
CV STRESS PEAK DIA BP HE: NORMAL
CV STRESS PEAK SYS BP HE: NORMAL
CV STRESS PHASE HE: NORMAL
CV STRESS PROTOCOL HE: NORMAL
CV STRESS RESTING PT POSITION HE: NORMAL
CV STRESS RESTING PT POSITION HE: NORMAL
CV STRESS ST DEVIATION AMOUNT HE: NORMAL
CV STRESS ST DEVIATION ELEVATION HE: NORMAL
CV STRESS ST EVELATION AMOUNT HE: NORMAL
CV STRESS TEST TYPE HE: NORMAL
CV STRESS TOTAL STAGE TIME MIN 1 HE: NORMAL
NUC STRESS EJECTION FRACTION: 67 %
RATE PRESSURE PRODUCT: NORMAL
STRESS ECHO BASELINE DIASTOLIC HE: 88
STRESS ECHO BASELINE HR: 66
STRESS ECHO BASELINE SYSTOLIC BP: 130
STRESS ECHO CALCULATED PERCENT HR: 101 %
STRESS ECHO LAST STRESS DIASTOLIC BP: 84
STRESS ECHO LAST STRESS HR: 156
STRESS ECHO LAST STRESS SYSTOLIC BP: 166
STRESS ECHO POST ESTIMATED WORKLOAD: 10.3 METS
STRESS ECHO POST EXERCISE DUR MIN: 8 MIN
STRESS ECHO POST EXERCISE DUR SEC: 0 SEC
STRESS ECHO TARGET HR: 164

## 2024-06-19 PROCEDURE — A9500 TC99M SESTAMIBI: HCPCS | Performed by: INTERNAL MEDICINE

## 2024-06-19 PROCEDURE — 343N000001 HC RX 343: Performed by: INTERNAL MEDICINE

## 2024-06-19 RX ADMIN — Medication 45.4 MILLICURIE: at 07:18

## 2024-07-08 PROCEDURE — 93272 ECG/REVIEW INTERPRET ONLY: CPT | Performed by: INTERNAL MEDICINE

## 2024-07-24 ENCOUNTER — OFFICE VISIT (OUTPATIENT)
Dept: CARDIOLOGY | Facility: CLINIC | Age: 59
End: 2024-07-24
Payer: COMMERCIAL

## 2024-07-24 VITALS
BODY MASS INDEX: 37.04 KG/M2 | WEIGHT: 304.2 LBS | HEART RATE: 72 BPM | RESPIRATION RATE: 12 BRPM | SYSTOLIC BLOOD PRESSURE: 102 MMHG | DIASTOLIC BLOOD PRESSURE: 68 MMHG | HEIGHT: 76 IN

## 2024-07-24 DIAGNOSIS — E66.812 CLASS 2 SEVERE OBESITY DUE TO EXCESS CALORIES WITH SERIOUS COMORBIDITY AND BODY MASS INDEX (BMI) OF 37.0 TO 37.9 IN ADULT (H): ICD-10-CM

## 2024-07-24 DIAGNOSIS — G47.33 OSA (OBSTRUCTIVE SLEEP APNEA): ICD-10-CM

## 2024-07-24 DIAGNOSIS — R94.39 ABNORMAL CARDIOVASCULAR STRESS TEST: Primary | ICD-10-CM

## 2024-07-24 DIAGNOSIS — I10 ESSENTIAL HYPERTENSION, BENIGN: ICD-10-CM

## 2024-07-24 DIAGNOSIS — E66.01 CLASS 2 SEVERE OBESITY DUE TO EXCESS CALORIES WITH SERIOUS COMORBIDITY AND BODY MASS INDEX (BMI) OF 37.0 TO 37.9 IN ADULT (H): ICD-10-CM

## 2024-07-24 PROCEDURE — 99204 OFFICE O/P NEW MOD 45 MIN: CPT | Performed by: INTERNAL MEDICINE

## 2024-07-24 NOTE — PROGRESS NOTES
Click to link to [facility] HEART CARE NOTE    Thank you, Dr. Lane, for asking me to see Jose Peres in consultation at [facility] to evaluate abnormal nuclear stress test.      Assessment/Plan:   Abnormal nuclear stress test: The patient had a syncopal episode in May which was caused by low blood pressure.  He had a ER evaluation.  ECG showed sinus rhythm, possible anterior infarct, mild LVH.  Laboratory test negative.  Subsequently he had cardiac event monitor which did not record significant abnormality.  He had a nuclear stress test a few weeks ago which was reported positive for inducible myocardial ischemia in RCA distribution, normal wall motion and systolic function.  The patient has a several risk factors for developing significant coronary artery disease.  We discussed the evaluation and management.  After discussion, coronary CT angiogram with calcium score is requested to rule out to significant stenosis in the RCA distribution.  The patient agreed with the plan.  Benign essential hypertension: His blood pressure is well-controlled with atenolol.  Morbid obesity, obstructive with sleep apnea: Lifestyle modification.    Thank you for the opportunity to be involved in the care of Jose Peres. If you have any questions, please feel free to contact me.  I will see the patient again in 3 months and as needed    Much or all of the text in this note was generated through the use of Dragon Dictate voice-to-text software. Errors in spelling or words which seem out of context are unintentional.   Sound alike errors, in particular, may have escaped editing.       History of Present Illness:   It is my pleasure to see Jose Peres at the [facility] for evaluation of Consult. Jose Peres is a 58 year old male with a medical history of morbid obesity, benign essential hypertension, obstructive with sleep apnea, erectile  dysfunction.    The patient had a syncopal episode in May.  He had ER evaluation at that time which was not impressive.  Subsequently he Had cardiac event monitor which was not significant.  He had a nuclear stress test 1 months ago which was reported as small size of inducible myocardial ischemia in the RCA distribution, no segmental wall motion abnormality, LVEF 67%.    He states that he had atypical chest pain 1 month ago.  He described anterior chest pain, stabbing pain, lasted less than a minute, no radiation, not exertional.  He complains of mild shortness of breath on exertion.  He has occasional palpitations, occasional lightheadedness dizziness.  He had no orthopnea, PND or leg edema.  His blood pressure and heart rate are well-controlled.    Past Medical History:     Patient Active Problem List   Diagnosis    Obesity (BMI 30-39.9)    Essential hypertension, benign    GARCÍA (obstructive sleep apnea)    Migraines    Anxiety    Benign prostatic hyperplasia with nocturia    Erectile dysfunction, unspecified erectile dysfunction type       Past Surgical History:     Past Surgical History:   Procedure Laterality Date    ARTHROSCOPY KNEE Bilateral     OTHER SURGICAL HISTORY      PILONIDAL ABSCESS    OTHER SURGICAL HISTORY Bilateral 2018    Inguinal hernia       Family History:     Family History   Problem Relation Age of Onset    Hypertension Mother     Hypertension Father     Glaucoma Father         1944    Heart Disease Other 40.00        FAMILY       Social History:    reports that he has never smoked. He has never been exposed to tobacco smoke. He has never used smokeless tobacco. He reports current alcohol use.    Review of Systems:   12 systems are reviewed negative except for in HPI.    Meds:     Current Outpatient Medications:     aspirin 81 MG EC tablet, Take 81 mg by mouth daily, Disp: , Rfl:     Calcium Carbonate-Vitamin D 500-5 MG-MCG TABS, Take 1 tablet by mouth daily, Disp: 90 tablet, Rfl: 3     "losartan (COZAAR) 100 MG tablet, Take 1 tablet (100 mg) by mouth daily, Disp: 90 tablet, Rfl: 3    sildenafil (REVATIO) 20 MG tablet, Take 1 tablet (20 mg) up to a maximum dose of 5 tablets (equals 100 mg) 1 hour prior to activity, maximum 1 dose in 24 hours. For erectile dysfunction., Disp: 15 tablet, Rfl: 3    SUMAtriptan (IMITREX) 100 MG tablet, Take 1 tablet (100 mg) by mouth at onset of headache for migraine May repeat dose in 2 hours.  Maximum 2 tablets in 24 hours., Disp: 30 tablet, Rfl: 1    tamsulosin (FLOMAX) 0.4 MG capsule, Take 1 capsule (0.4 mg) by mouth daily, Disp: 90 capsule, Rfl: 3    vitamin C (ASCORBIC ACID) 500 MG tablet, Take 1 tablet (500 mg) by mouth daily, Disp: 90 tablet, Rfl: 3     Allergies:   Lisinopril and Metformin    Objective:      Physical Exam  138 kg (304 lb 3.2 oz)  1.93 m (6' 4\")  Body mass index is 37.03 kg/m .  /68 (BP Location: Left arm, Patient Position: Sitting, Cuff Size: Adult Large)   Pulse 72   Resp 12   Ht 1.93 m (6' 4\")   Wt 138 kg (304 lb 3.2 oz)   BMI 37.03 kg/m      General Appearance:   Awake, Alert, No acute distress.   HEENT:  Pupil equal, reactive to light. No scleral icterus; the mucous membranes were moist. No oral ulcers or thrush.    Neck: No cervical bruits. No JVD. No thyromegaly. No lymph node enlargement or tenderness.   Chest: The spine was straight. The chest was symmetric.   Lungs:   Respirations unlabored. Lungs are clear to auscultation. No crackles. No wheezing.   Cardiovascular:   RRR, normal first and second heart sounds with no murmurs. No rubs or gallops.    Abdomen:  Obese.  Soft. No tenderness. Non-distended. Bowels sounds are present   Extremities: Equal posterior tibial pulses. No leg edema.   Skin: No rashes or ulcers. Warm, Dry.   Musculoskeletal: No tenderness. No deformity.   Neurologic: Mood and affect are appropriate. No focal deficits.         EKG:  Personally reivewed  Sinus rhythm.   Anterior infarct, age " undetermined  Mild LVH  Abnormal ECG    Cardiac Imaging Studies  Nuclear stress test on June 19, 2024:    The nuclear stress test is abnormal.    An exercise stress test was performed following a Hayden protocol with the patient exercising for 8 minutes and 0 seconds. No symptoms reported during stress.    The stress electrocardiogram is negative for inducible ischemic EKG changes.    There is a small area of mild ischemia in the mid to basal inferoseptal segment(s) of the left ventricle.    The left ventricular ejection fraction at stress is 67%.    The patient's exercise capacity is average.    The patient is at a low risk of future cardiac ischemic events.    The  images demonstrate diaphragmatic attenuation. Final image quality is suboptimal.    There is no prior study for comparison.    Lab Review   Lab Results   Component Value Date     11/13/2023    CO2 23 11/13/2023    CO2 25 04/26/2021    BUN 19.4 11/13/2023    BUN 16 04/26/2021     Lab Results   Component Value Date    WBC 9.0 10/05/2022    HGB 15.7 10/05/2022    HCT 47.3 10/05/2022    MCV 93 10/05/2022     10/05/2022     Lab Results   Component Value Date    CHOL 144 11/13/2023    TRIG 58 11/13/2023    HDL 55 11/13/2023    LDL 77 11/13/2023     LDL Cholesterol Calculated   Date Value Ref Range Status   11/13/2023 77 <=100 mg/dL Final     Lab Results   Component Value Date    TSH 1.28 10/05/2022    TSH 0.89 04/26/2021

## 2024-07-24 NOTE — LETTER
"September 30, 2024      Jose Peres  [address] [place] [address]        Dear ,    We are writing to inform you of your test results.    The CT Coronary angiogram with calcium score was 0, normal. On the over read conducted by one of our radiologist, they noted:     \"LIMITED CHEST: Mild basilar atelectasis. Couple incidental calcified granulomas. 3 mm incidental right lung subpleural lymph node (series 5, image 12).\"     I have forwarded this result onto your primary care provider and would advise discussing further with him if there is any further imaging he would like or if this is something he would just like to monitor in the future.    Resulted Orders   CT Coronary Artery Angio w Calcium Score   Result Value Ref Range    Agatston Score of Left Main 0     Agatston Score of the Left Anterior Descending 0     Agatston Score of Circumflex 0     Agatston Score of Right Coronary Artery 0     Total Score 0.00     BSA 0.00 m2    CCTA SINUS 4.2     CCTA ASCENDING AORTA 4.0     Narrative      The total Agatston score is 0. A calcium score of zero places the   individual in the lowest quartile when compared to an age and gender   matched control group.    Normal coronary arteries with no evidence of atherosclerosis.    Radiology review for incidental non cardiac findings will be under   separate report by the radiologist.         If you have any questions or concerns, please call the clinic at the number listed above.       Sincerely,      Wu Mora MD            "

## 2024-07-24 NOTE — LETTER
7/24/2024    TAHIRA SOLOMON MD  [address] [place] [address]    RE: Jose Peres       Dear Colleague,     I had the pleasure of seeing Jose Peres in the [facility].      Click to link to [facility] HEART CARE NOTE    Thank you, Dr. Solomon, for asking me to see Jose Peres in consultation at [facility] to evaluate abnormal nuclear stress test.      Assessment/Plan:   Abnormal nuclear stress test: The patient had a syncopal episode in May which was caused by low blood pressure.  He had a ER evaluation.  ECG showed sinus rhythm, possible anterior infarct, mild LVH.  Laboratory test negative.  Subsequently he had cardiac event monitor which did not record significant abnormality.  He had a nuclear stress test a few weeks ago which was reported positive for inducible myocardial ischemia in RCA distribution, normal wall motion and systolic function.  The patient has a several risk factors for developing significant coronary artery disease.  We discussed the evaluation and management.  After discussion, coronary CT angiogram with calcium score is requested to rule out to significant stenosis in the RCA distribution.  The patient agreed with the plan.  Benign essential hypertension: His blood pressure is well-controlled with atenolol.  Morbid obesity, obstructive with sleep apnea: Lifestyle modification.    Thank you for the opportunity to be involved in the care of Jose Peres. If you have any questions, please feel free to contact me.  I will see the patient again in 3 months and as needed    Much or all of the text in this note was generated through the use of Dragon Dictate voice-to-text software. Errors in spelling or words which seem out of context are unintentional.   Sound alike errors, in particular, may have escaped editing.       History of Present Illness:   It is my pleasure to see Jose Peres at the [facility] clinic for evaluation of Consult. Jose Peres is a 58 year old male with a medical history of morbid obesity, benign essential hypertension, obstructive with sleep apnea, erectile dysfunction.    The patient had a syncopal episode in May.  He had ER evaluation at that time which was not impressive.  Subsequently he Had cardiac event monitor which was not significant.  He had a nuclear stress test 1 months ago which was reported as small size of inducible myocardial ischemia in the RCA distribution, no segmental wall motion abnormality, LVEF 67%.    He states that he had atypical chest pain 1 month ago.  He described anterior chest pain, stabbing pain, lasted less than a minute, no radiation, not exertional.  He complains of mild shortness of breath on exertion.  He has occasional palpitations, occasional lightheadedness dizziness.  He had no orthopnea, PND or leg edema.  His blood pressure and heart rate are well-controlled.    Past Medical History:     Patient Active Problem List   Diagnosis    Obesity (BMI 30-39.9)    Essential hypertension, benign    GARCÍA (obstructive sleep apnea)    Migraines    Anxiety    Benign prostatic hyperplasia with nocturia    Erectile dysfunction, unspecified erectile dysfunction type       Past Surgical History:     Past Surgical History:   Procedure Laterality Date    ARTHROSCOPY KNEE Bilateral     OTHER SURGICAL HISTORY      PILONIDAL ABSCESS    OTHER SURGICAL HISTORY Bilateral 2018    Inguinal hernia       Family History:     Family History   Problem Relation Age of Onset    Hypertension Mother     Hypertension Father     Glaucoma Father         1944    Heart Disease Other 40.00        FAMILY       Social History:    reports that he has never smoked. He has never been exposed to tobacco smoke. He has never used smokeless tobacco. He reports current alcohol use.    Review of Systems:   12 systems are reviewed negative except for in HPI.    Meds:     Current Outpatient  "Medications:     aspirin 81 MG EC tablet, Take 81 mg by mouth daily, Disp: , Rfl:     Calcium Carbonate-Vitamin D 500-5 MG-MCG TABS, Take 1 tablet by mouth daily, Disp: 90 tablet, Rfl: 3    losartan (COZAAR) 100 MG tablet, Take 1 tablet (100 mg) by mouth daily, Disp: 90 tablet, Rfl: 3    sildenafil (REVATIO) 20 MG tablet, Take 1 tablet (20 mg) up to a maximum dose of 5 tablets (equals 100 mg) 1 hour prior to activity, maximum 1 dose in 24 hours. For erectile dysfunction., Disp: 15 tablet, Rfl: 3    SUMAtriptan (IMITREX) 100 MG tablet, Take 1 tablet (100 mg) by mouth at onset of headache for migraine May repeat dose in 2 hours.  Maximum 2 tablets in 24 hours., Disp: 30 tablet, Rfl: 1    tamsulosin (FLOMAX) 0.4 MG capsule, Take 1 capsule (0.4 mg) by mouth daily, Disp: 90 capsule, Rfl: 3    vitamin C (ASCORBIC ACID) 500 MG tablet, Take 1 tablet (500 mg) by mouth daily, Disp: 90 tablet, Rfl: 3     Allergies:   Lisinopril and Metformin    Objective:      Physical Exam  138 kg (304 lb 3.2 oz)  1.93 m (6' 4\")  Body mass index is 37.03 kg/m .  /68 (BP Location: Left arm, Patient Position: Sitting, Cuff Size: Adult Large)   Pulse 72   Resp 12   Ht 1.93 m (6' 4\")   Wt 138 kg (304 lb 3.2 oz)   BMI 37.03 kg/m      General Appearance:   Awake, Alert, No acute distress.   HEENT:  Pupil equal, reactive to light. No scleral icterus; the mucous membranes were moist. No oral ulcers or thrush.    Neck: No cervical bruits. No JVD. No thyromegaly. No lymph node enlargement or tenderness.   Chest: The spine was straight. The chest was symmetric.   Lungs:   Respirations unlabored. Lungs are clear to auscultation. No crackles. No wheezing.   Cardiovascular:   RRR, normal first and second heart sounds with no murmurs. No rubs or gallops.    Abdomen:  Obese.  Soft. No tenderness. Non-distended. Bowels sounds are present   Extremities: Equal posterior tibial pulses. No leg edema.   Skin: No rashes or ulcers. Warm, Dry. "   Musculoskeletal: No tenderness. No deformity.   Neurologic: Mood and affect are appropriate. No focal deficits.         EKG:  Personally reivewed  Sinus rhythm.   Anterior infarct, age undetermined  Mild LVH  Abnormal ECG    Cardiac Imaging Studies  Nuclear stress test on June 19, 2024:    The nuclear stress test is abnormal.    An exercise stress test was performed following a Hayden protocol with the patient exercising for 8 minutes and 0 seconds. No symptoms reported during stress.    The stress electrocardiogram is negative for inducible ischemic EKG changes.    There is a small area of mild ischemia in the mid to basal inferoseptal segment(s) of the left ventricle.    The left ventricular ejection fraction at stress is 67%.    The patient's exercise capacity is average.    The patient is at a low risk of future cardiac ischemic events.    The  images demonstrate diaphragmatic attenuation. Final image quality is suboptimal.    There is no prior study for comparison.    Lab Review   Lab Results   Component Value Date     11/13/2023    CO2 23 11/13/2023    CO2 25 04/26/2021    BUN 19.4 11/13/2023    BUN 16 04/26/2021     Lab Results   Component Value Date    WBC 9.0 10/05/2022    HGB 15.7 10/05/2022    HCT 47.3 10/05/2022    MCV 93 10/05/2022     10/05/2022     Lab Results   Component Value Date    CHOL 144 11/13/2023    TRIG 58 11/13/2023    HDL 55 11/13/2023    LDL 77 11/13/2023     LDL Cholesterol Calculated   Date Value Ref Range Status   11/13/2023 77 <=100 mg/dL Final     Lab Results   Component Value Date    TSH 1.28 10/05/2022    TSH 0.89 04/26/2021                   Thank you for allowing me to participate in the care of your patient.      Sincerely,     Wu Mora MD     [facility]  cc:   Dontae Lane MD  [facility] [place]  [place] [address]

## 2024-08-18 ENCOUNTER — MYC REFILL (OUTPATIENT)
Dept: FAMILY MEDICINE | Facility: CLINIC | Age: 59
End: 2024-08-18
Payer: COMMERCIAL

## 2024-08-18 DIAGNOSIS — N52.9 ERECTILE DYSFUNCTION, UNSPECIFIED ERECTILE DYSFUNCTION TYPE: ICD-10-CM

## 2024-08-19 RX ORDER — SILDENAFIL CITRATE 20 MG/1
TABLET ORAL
Qty: 15 TABLET | Refills: 3 | Status: SHIPPED | OUTPATIENT
Start: 2024-08-19

## 2024-08-28 RX ORDER — METOPROLOL TARTRATE 1 MG/ML
5-15 INJECTION, SOLUTION INTRAVENOUS
Status: DISCONTINUED | OUTPATIENT
Start: 2024-08-28 | End: 2024-09-11 | Stop reason: HOSPADM

## 2024-08-28 RX ORDER — NITROGLYCERIN 0.4 MG/1
0.4 TABLET SUBLINGUAL
Status: DISCONTINUED | OUTPATIENT
Start: 2024-08-28 | End: 2024-09-11 | Stop reason: HOSPADM

## 2024-09-10 ENCOUNTER — HOSPITAL ENCOUNTER (OUTPATIENT)
Dept: CT IMAGING | Facility: CLINIC | Age: 59
Discharge: HOME | End: 2024-09-10
Attending: INTERNAL MEDICINE | Admitting: INTERNAL MEDICINE
Payer: COMMERCIAL

## 2024-09-10 VITALS — SYSTOLIC BLOOD PRESSURE: 116 MMHG | HEART RATE: 70 BPM | DIASTOLIC BLOOD PRESSURE: 74 MMHG

## 2024-09-10 DIAGNOSIS — R94.39 ABNORMAL CARDIOVASCULAR STRESS TEST: ICD-10-CM

## 2024-09-10 LAB
BSA FOR ECHO PROCEDURE: 0 M2
CCTA ASCENDING AORTA: 4
CCTA SINUS: 4.2
CREAT BLD-MCNC: 1 MG/DL (ref 0.7–1.3)
CV CALCIUM SCORE AGATSTON LM: 0
CV CALCIUM SCORING AGATSON LAD: 0
CV CALCIUM SCORING AGATSTON CX: 0
CV CALCIUM SCORING AGATSTON RCA: 0
CV CALCIUM SCORING AGATSTON TOTAL: 0
EGFRCR SERPLBLD CKD-EPI 2021: >60 ML/MIN/1.73M2

## 2024-09-10 PROCEDURE — 999N000248 HC STATISTIC IV INSERT WITH US BY RN

## 2024-09-10 PROCEDURE — 75574 CT ANGIO HRT W/3D IMAGE: CPT

## 2024-09-10 PROCEDURE — 250N000011 HC RX IP 250 OP 636: Performed by: INTERNAL MEDICINE

## 2024-09-10 PROCEDURE — 75574 CT ANGIO HRT W/3D IMAGE: CPT | Mod: 26 | Performed by: GENERAL ACUTE CARE HOSPITAL

## 2024-09-10 PROCEDURE — 82565 ASSAY OF CREATININE: CPT

## 2024-09-10 PROCEDURE — 250N000013 HC RX MED GY IP 250 OP 250 PS 637: Performed by: INTERNAL MEDICINE

## 2024-09-10 RX ORDER — IOPAMIDOL 755 MG/ML
100 INJECTION, SOLUTION INTRAVASCULAR ONCE
Status: COMPLETED | OUTPATIENT
Start: 2024-09-10 | End: 2024-09-10

## 2024-09-10 RX ADMIN — NITROGLYCERIN 0.4 MG: 0.4 TABLET SUBLINGUAL at 08:57

## 2024-09-10 RX ADMIN — IOPAMIDOL 100 ML: 755 INJECTION, SOLUTION INTRAVENOUS at 09:03

## 2024-10-30 ENCOUNTER — OFFICE VISIT (OUTPATIENT)
Dept: SLEEP MEDICINE | Facility: CLINIC | Age: 59
End: 2024-10-30
Attending: INTERNAL MEDICINE
Payer: COMMERCIAL

## 2024-10-30 VITALS
WEIGHT: 312 LBS | BODY MASS INDEX: 37.99 KG/M2 | HEART RATE: 74 BPM | DIASTOLIC BLOOD PRESSURE: 88 MMHG | HEIGHT: 76 IN | OXYGEN SATURATION: 94 % | SYSTOLIC BLOOD PRESSURE: 129 MMHG

## 2024-10-30 DIAGNOSIS — G47.33 OSA (OBSTRUCTIVE SLEEP APNEA): Primary | ICD-10-CM

## 2024-10-30 PROCEDURE — 99204 OFFICE O/P NEW MOD 45 MIN: CPT | Performed by: INTERNAL MEDICINE

## 2024-10-30 ASSESSMENT — SLEEP AND FATIGUE QUESTIONNAIRES
HOW LIKELY ARE YOU TO NOD OFF OR FALL ASLEEP WHEN YOU ARE A PASSENGER IN A CAR FOR AN HOUR WITHOUT A BREAK: SLIGHT CHANCE OF DOZING
HOW LIKELY ARE YOU TO NOD OFF OR FALL ASLEEP IN A CAR, WHILE STOPPED FOR A FEW MINUTES IN TRAFFIC: WOULD NEVER DOZE
HOW LIKELY ARE YOU TO NOD OFF OR FALL ASLEEP WHILE WATCHING TV: MODERATE CHANCE OF DOZING
HOW LIKELY ARE YOU TO NOD OFF OR FALL ASLEEP WHILE LYING DOWN TO REST IN THE AFTERNOON WHEN CIRCUMSTANCES PERMIT: MODERATE CHANCE OF DOZING
HOW LIKELY ARE YOU TO NOD OFF OR FALL ASLEEP WHILE SITTING AND TALKING TO SOMEONE: WOULD NEVER DOZE
HOW LIKELY ARE YOU TO NOD OFF OR FALL ASLEEP WHILE SITTING AND READING: HIGH CHANCE OF DOZING
HOW LIKELY ARE YOU TO NOD OFF OR FALL ASLEEP WHILE SITTING QUIETLY AFTER LUNCH WITHOUT ALCOHOL: WOULD NEVER DOZE
HOW LIKELY ARE YOU TO NOD OFF OR FALL ASLEEP WHILE SITTING INACTIVE IN A PUBLIC PLACE: WOULD NEVER DOZE

## 2024-10-30 NOTE — NURSING NOTE
"Chief Complaint   Patient presents with    Sleep Problem     Establish care, New machine       Initial /88   Pulse 74   Ht 1.93 m (6' 4\")   Wt 141.5 kg (312 lb)   SpO2 94%   BMI 37.98 kg/m   Estimated body mass index is 37.98 kg/m  as calculated from the following:    Height as of this encounter: 1.93 m (6' 4\").    Weight as of this encounter: 141.5 kg (312 lb).    Medication Reconciliation: complete  ESS 8  Neck circumference: 46 centimeters.  Kim Terry MA   "

## 2024-10-30 NOTE — PROGRESS NOTES
Sleep Consultation:    Date on this visit: 10/30/2024    Jose Peres  is referred by Dontae Lane for a sleep consultation.     Primary Physician: Dontae Lane     Jose Peres presents to clinic for management of obstructive sleep apnea.    Patient had split-night PSG in 2017 at a BMI of 36.7, which showed moderate obstructive sleep apnea with an apnea-hypopnea index of 17.8/h.  CPAP at a pressure of 9 cm H2O was effective.    He is currently prescribed CPAP therapy at a pressure of 9 cm H2O.    His medical history significant for hypertension.    Overall, he rates the experience with PAP as ok. The mask is comfortable. The mask is not leaking.  He is not snoring with the mask on. He is not having significant oral/nasal dryness. The pressure settings are comfortable.     He uses nasal mask.     He does feel rested in the morning.      [employer]Med   CPAP 9 cmH2O download:  72/90 total days of use. 18 nonuse days. 44% days with >4 hours use.  Average use 4 hours 29 minutes per day. Median Leak 1 L/min. 95%ile Leak 14.8 L/min. AHI 0.9.     Jose goes to sleep at 10:00 PM during the week. He wakes up at 5:30 AM. He falls asleep in 15 minutes.  Jose denies difficulty falling asleep.  He wakes up 1-2 times a night for 30-60 minutes before falling back to sleep.  Jose wakes up to uncertain reasons.  On weekends, Jose goes to sleep at 10:00 PM.  He wakes up at 5:30 AM. He falls asleep in 15 minutes.      Jose denies any sleep walking, sleep talking, dream enactment, sleep paralysis, cataplexy, and hypnogogic/hypnopompic hallucinations.    Patient's [place] Sleepiness score 8/24 consistent with no daytime sleepiness.      Jose naps 1-2 times per week for 20-30 minutes, feels refreshed after naps. He takes no inadvertant naps.  He denies closing eyes, dozing, and falling asleep while driving. Patient was counseled on the importance of driving while alert, to pull over if drowsy, or nap before getting into the vehicle  "if sleepy.      He uses 2 cups/day of coffee, 1 sodas/day. Last caffeine intake is usually before 2 PM.    He has 1 glass of wine per day with dinner.     Problem List:  Patient Active Problem List    Diagnosis Date Noted    Class 2 severe obesity due to excess calories with serious comorbidity in adult (H) 07/24/2024     Priority: Medium    Erectile dysfunction, unspecified erectile dysfunction type 09/30/2022     Priority: Medium    Migraines      Priority: Medium    Anxiety      Priority: Medium    Benign prostatic hyperplasia with nocturia      Priority: Medium    GARCÍA (obstructive sleep apnea) 06/09/2017     Priority: Medium    Essential hypertension, benign 01/27/2017     Priority: Medium    Obesity (BMI 30-39.9) 11/25/2015     Priority: Medium        Past Medical/Surgical History:  Past Medical History:   Diagnosis Date    Chronic depression 11/25/2015    Depression     HOSPITALIZATION 1996    HTN (hypertension)     Migraines     Nephrolithiasis 2008    Obesity (BMI 30-39.9)     BMI 36.8    OCD (obsessive compulsive disorder)     GARCÍA (obstructive sleep apnea)     moderate     Physical Examination:  Vitals: /88   Pulse 74   Ht 1.93 m (6' 4\")   Wt 141.5 kg (312 lb)   SpO2 94%   BMI 37.98 kg/m    BMI= Body mass index is 37.98 kg/m .  GENERAL APPEARANCE: healthy, alert, and no distress  HENT: oropharynx crowded  NEURO: mentation intact and speech normal  PSYCH: mentation appears normal and affect normal/bright  Mallampati Class: IV.  Tonsillar Stage: 1  hidden by pillars.    Impression/Plan:    Obstructive sleep apnea  Hypertension   Insomnia,psychophysiologic     Patient is using CPAP regularly and is continuing to benefit from therapy.  He does report having sleep maintenance difficulty, mainly on the weekdays.  On these nights, he tends to remove the mask during the night and this explains shorter period of usage on these nights.  He cannot specify clear precipitating factors other than overactive " mind and tendency to ruminate in bed.  Review of download data from his device shows normal residual AHI at a CPAP setting of 9 cm H2O.  In order to try if auto CPAP settings may improve outcome, I recommended changing settings to auto CPAP.    Otherwise, sleep maintenance difficulty seems to be mainly present perpetuated by tendency to think and plan in bed.  We discussed optimizing sleep wake schedules and scheduling a time to journal during the evening to reduce sleep interfering arousal.    Plan:     Continue CPAP therapy with auto CPAP settings of 9 to 12 cm H2O    Obstructive sleep apnea reviewed.  Complications of untreated sleep apnea were reviewed.    I spent a total of 45 minutes for this appointment on this date of service which include time spent before, during and after the visit for chart review, patient care, counseling and coordination of care.      Electronically signed by Dr. Shan Garcia, Diplomate, Sleep Medicine, ABPN       CC: Dontae Lane

## 2024-11-13 SDOH — HEALTH STABILITY: PHYSICAL HEALTH: ON AVERAGE, HOW MANY DAYS PER WEEK DO YOU ENGAGE IN MODERATE TO STRENUOUS EXERCISE (LIKE A BRISK WALK)?: 4 DAYS

## 2024-11-13 SDOH — HEALTH STABILITY: PHYSICAL HEALTH: ON AVERAGE, HOW MANY MINUTES DO YOU ENGAGE IN EXERCISE AT THIS LEVEL?: 60 MIN

## 2024-11-13 ASSESSMENT — SOCIAL DETERMINANTS OF HEALTH (SDOH): HOW OFTEN DO YOU GET TOGETHER WITH FRIENDS OR RELATIVES?: MORE THAN THREE TIMES A WEEK

## 2024-11-14 ENCOUNTER — OFFICE VISIT (OUTPATIENT)
Dept: INTERNAL MEDICINE | Facility: CLINIC | Age: 59
End: 2024-11-14
Attending: STUDENT IN AN ORGANIZED HEALTH CARE EDUCATION/TRAINING PROGRAM
Payer: COMMERCIAL

## 2024-11-14 VITALS
HEART RATE: 71 BPM | RESPIRATION RATE: 13 BRPM | SYSTOLIC BLOOD PRESSURE: 120 MMHG | DIASTOLIC BLOOD PRESSURE: 82 MMHG | BODY MASS INDEX: 38.36 KG/M2 | HEIGHT: 76 IN | WEIGHT: 315 LBS | TEMPERATURE: 98.5 F | OXYGEN SATURATION: 95 %

## 2024-11-14 DIAGNOSIS — F51.01 PRIMARY INSOMNIA: ICD-10-CM

## 2024-11-14 DIAGNOSIS — E66.01 CLASS 2 SEVERE OBESITY DUE TO EXCESS CALORIES WITH SERIOUS COMORBIDITY AND BODY MASS INDEX (BMI) OF 39.0 TO 39.9 IN ADULT (H): ICD-10-CM

## 2024-11-14 DIAGNOSIS — Z00.00 ROUTINE GENERAL MEDICAL EXAMINATION AT A HEALTH CARE FACILITY: Primary | ICD-10-CM

## 2024-11-14 DIAGNOSIS — G47.33 OSA (OBSTRUCTIVE SLEEP APNEA): ICD-10-CM

## 2024-11-14 DIAGNOSIS — E66.812 CLASS 2 SEVERE OBESITY DUE TO EXCESS CALORIES WITH SERIOUS COMORBIDITY AND BODY MASS INDEX (BMI) OF 39.0 TO 39.9 IN ADULT (H): ICD-10-CM

## 2024-11-14 DIAGNOSIS — I10 ESSENTIAL HYPERTENSION, BENIGN: ICD-10-CM

## 2024-11-14 DIAGNOSIS — Z12.5 SCREENING PSA (PROSTATE SPECIFIC ANTIGEN): ICD-10-CM

## 2024-11-14 LAB
ALBUMIN SERPL BCG-MCNC: 4.3 G/DL (ref 3.5–5.2)
ALP SERPL-CCNC: 63 U/L (ref 40–150)
ALT SERPL W P-5'-P-CCNC: 19 U/L (ref 0–70)
ANION GAP SERPL CALCULATED.3IONS-SCNC: 10 MMOL/L (ref 7–15)
AST SERPL W P-5'-P-CCNC: 20 U/L (ref 0–45)
BILIRUB SERPL-MCNC: 0.6 MG/DL
BUN SERPL-MCNC: 19.3 MG/DL (ref 6–20)
CALCIUM SERPL-MCNC: 9.1 MG/DL (ref 8.8–10.4)
CHLORIDE SERPL-SCNC: 104 MMOL/L (ref 98–107)
CHOLEST SERPL-MCNC: 143 MG/DL
CREAT SERPL-MCNC: 0.98 MG/DL (ref 0.67–1.17)
EGFRCR SERPLBLD CKD-EPI 2021: >90 ML/MIN/1.73M2
ERYTHROCYTE [DISTWIDTH] IN BLOOD BY AUTOMATED COUNT: 11.6 % (ref 10–15)
EST. AVERAGE GLUCOSE BLD GHB EST-MCNC: 108 MG/DL
FASTING STATUS PATIENT QL REPORTED: YES
FASTING STATUS PATIENT QL REPORTED: YES
GLUCOSE SERPL-MCNC: 110 MG/DL (ref 70–99)
HBA1C MFR BLD: 5.4 % (ref 0–5.6)
HCO3 SERPL-SCNC: 24 MMOL/L (ref 22–29)
HCT VFR BLD AUTO: 45 % (ref 40–53)
HDLC SERPL-MCNC: 45 MG/DL
HGB BLD-MCNC: 15 G/DL (ref 13.3–17.7)
LDLC SERPL CALC-MCNC: 81 MG/DL
MCH RBC QN AUTO: 31.6 PG (ref 26.5–33)
MCHC RBC AUTO-ENTMCNC: 33.3 G/DL (ref 31.5–36.5)
MCV RBC AUTO: 95 FL (ref 78–100)
NONHDLC SERPL-MCNC: 98 MG/DL
PLATELET # BLD AUTO: 234 10E3/UL (ref 150–450)
POTASSIUM SERPL-SCNC: 4.4 MMOL/L (ref 3.4–5.3)
PROT SERPL-MCNC: 7.1 G/DL (ref 6.4–8.3)
PSA SERPL DL<=0.01 NG/ML-MCNC: 0.53 NG/ML (ref 0–3.5)
RBC # BLD AUTO: 4.74 10E6/UL (ref 4.4–5.9)
SODIUM SERPL-SCNC: 138 MMOL/L (ref 135–145)
TRIGL SERPL-MCNC: 83 MG/DL
TSH SERPL DL<=0.005 MIU/L-ACNC: 0.98 UIU/ML (ref 0.3–4.2)
WBC # BLD AUTO: 8.6 10E3/UL (ref 4–11)

## 2024-11-14 RX ORDER — HYDROXYZINE HYDROCHLORIDE 25 MG/1
25 TABLET, FILM COATED ORAL
Qty: 30 TABLET | Refills: 3 | Status: SHIPPED | OUTPATIENT
Start: 2024-11-14

## 2024-11-14 NOTE — PATIENT INSTRUCTIONS
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  Jose already got his seasonal flu shot and updated COVID-vaccine.  He completed his cardiology consultation with Dr. Wu Lane, and his stress test was believed to be a false positive, since his coronary arteries look fine on CT angiography    Prescription issued for hydroxyzine 25 mg at bedtime to help maintain sleep    Stressors  His daughter and her family moved in with Jose and his wife.  Makes for a bit of a crowded household, but that is only temporary, because his daughter and her family are moving back in their own house once repairs are done    Jose's mother  in 2024 at age 80.  Jose's father survives, living in [place].    Jose admits that his work is also been rather stressful.  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    The stress is probably contributed to Jose being a little careless with dietary habits, and may be slacking off on exercise.  He is determined to get good habits going forward into .    Obstructive sleep apnea, uses CPAP every night  Frequent awakenings, Jose would like to get restarted on hydroxyzine used on an intermittent basis, which I think is worth doing.  Will give Jose hydroxyzine 25 mg tablet    Jose struggling a bit with the CPAP equipment, using it may be 4 hours a night.  His CPAP machine is supposed to transmit data, but Jose says it is having trouble sinking up to the .     Follow-up 10-  Pressure randolph adjusted  CPAP 9 cmH2O download:   total days of use. 18 nonuse days. 44% days with >4 hours use.  Average use 4 hours 29  minutes per day. Median Leak 1 L/min. 95%ile Leak 14.8 L/min. AHI 0.9.     False positive cardiac stress test  7-    The total Agatston score is 0. A calcium score of zero places the individual in the lowest quartile when compared to an age and gender matched control group.    Normal coronary arteries with no evidence of atherosclerosis.  Nuclear stress test on June 19, 2024:    The nuclear stress test is abnormal.    An exercise stress test was performed following a Hayden protocol with the patient exercising for 8 minutes and 0 seconds. No symptoms reported during stress.    The stress electrocardiogram is negative for inducible ischemic EKG changes.    There is a small area of mild ischemia in the mid to basal inferoseptal segment(s) of the left ventricle.    The left ventricular ejection fraction at stress is 67%.    The patient's exercise capacity is average.    11-  LDL Cholesterol Calculated  <=100 mg/dL 77     Direct Measure HDL  >=40 mg/dL 55     Triglycerides  <150 mg/dL 58     No recurrence (as of November 2024)  Syncope spell after eating at a restaurant in [place] May 2024--probably vasovagal, also consider potential effects from the triamterene hydrochlorothiazide that he was taking for blood pressure, and may be the sildenafil he was using for ED, plus a heavy meal of beer and fish and chips.      Obesity with body mass index almost 38, approaching the morbidly obese threshold of 40  Although Jose does not have a history of diabetes, I think he be an excellent candidate to try metformin which has GLP-1 like effects and might produce meaningful weight loss    Summer 2023 weighed 275    Wt Readings from Last 5 Encounters:   11/14/24 142.9 kg (315 lb)   10/30/24 141.5 kg (312 lb)   07/24/24 138 kg (304 lb 3.2 oz)   05/31/24 138.8 kg (306 lb)   11/13/23 84.1 kg (185 lb 8 oz)      Tried metformin for about 6 week, side effects    He can have all the vegetables he wants  Moderate alcohol  intake to at most 1 serving a day which might be a beer or small glass of wine  Get some sort of physical activity every day, such as a brisk walk of 2 miles        History COVID-19 infection, symptoms onset September 2023  while he was traveling in [place]     I told Jose that he should hold off on the bivalent booster for at least 2 months, maybe 3, and that he probably will have at least several months of immunity against omicron     Hypertension in the context of obesity and obstructive sleep apnea  Losartan 100 mg daily  history of ACE inhibitor cough  May 11, 2024 stopped triamterene hydrochlorothiazide      BP Readings from Last 6 Encounters:   11/14/24 120/82   10/30/24 129/88   09/10/24 116/74   07/24/24 102/68   05/31/24 134/82   11/13/23 130/88     Anxiety with OCD tendencies, history of depression, but symptoms have been under decent control since stopping bupropion in May 2020.    He had more significant depression symptoms in his 30s.  Nowadays he manages with cognitive behavioral therapy type of techniques.  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  He Once upon a time use Celexa.       Alcohol consumption sounds modest, about a beer a day, although that is a source of calories.     Migraine/vascular headaches, for which sumatriptan and has worked as abortive therapy, although he does consume a lot of caffeine.      History of left knee meniscus surgery and lateral collateral ligament repair in 1980s, he has implemented strength training to help stabilize his knee.      Status post bilateral inguinal repair surgery 2018    Plantar fasciitis and foot pain, suggested that Jose try the over-the-counter [employer] StudioseeDormir green inserts, available from [email or web address]     Benign prostatic hyperplasia symptoms with nighttime urination about 2-3  Alpha-blocker tamsulosin 0.4 mg once a day-- helpful    Another medication to consider is finasteride,  which gradually shrink the prostate gland over the course of a year by blocking the action of testosterone on prostate gland tissue.      Erectile dysfunction, common type  Generic sildenafil 20 mg tablet, single pill helps    Bilateral earwax, I reminded Jose to use over-the-counter wax dissolving eardrops, example brand Debrox or Murine     Colonoscopy done January 2024, but I am having trouble finding the report  There may have been polyps, because Jose remembers that they told him to come back in 5 years    Vaccines    Immunization History   Administered Date(s) Administered    COVID-19 12+ (Pfizer) 10/20/2023, 09/20/2024    COVID-19 MONOVALENT 12+ (Pfizer) 03/19/2021, 04/09/2021, 12/23/2021    COVID-19 Monovalent 12+ (Pfizer 2022) 08/17/2022    Flu, Unspecified 10/10/2021    INFLUENZA,TRIVALENT (FLUCELVAX) 09/20/2024    Influenza (IIV3) PF 10/11/2005    Influenza Vaccine 18-64 (Flublok) 10/20/2023    Influenza Vaccine >6 months,quad, PF 09/11/2020    Influenza,INJ,MDCK,PF,Quad >6mo(Flucelvax) 09/02/2022    TD,PF 7+ (Tenivac) 10/11/2005    TDAP (Adacel,Boostrix) 04/01/2016    Zoster recombinant adjuvanted (SHINGRIX) 01/31/2020, 04/26/2021

## 2024-11-14 NOTE — PROGRESS NOTES
Preventive Care Visit  [facility]  TAHIRA SOLOMON MD, Internal Medicine  Nov 14, 2024    Ximena Garcia is a 58 year old, presenting for the following:  Physical (Fasting. Med check)        11/14/2024     7:20 AM   Additional Questions   Roomed by Angie ANTHONY   Accompanied by joe TOWNSEND    Health Care Directive  Patient does not have a Health Care Directive: Patient states has Advance Directive and will bring in a copy to clinic.      11/13/2024   General Health   How would you rate your overall physical health? Good   Feel stress (tense, anxious, or unable to sleep) Very much      (!) STRESS CONCERN      11/13/2024   Nutrition   Three or more servings of calcium each day? Yes   Diet: Regular (no restrictions)   How many servings of fruit and vegetables per day? 4 or more   How many sweetened beverages each day? 0-1            11/13/2024   Exercise   Days per week of moderate/strenous exercise 4 days   Average minutes spent exercising at this level 60 min            11/13/2024   Social Factors   Frequency of gathering with friends or relatives More than three times a week   Worry food won't last until get money to buy more No   Food not last or not have enough money for food? No   Do you have housing? (Housing is defined as stable permanent housing and does not include staying ouside in a car, in a tent, in an abandoned building, in an overnight shelter, or couch-surfing.) No   Are you worried about losing your housing? No   Lack of transportation? No   Unable to get utilities (heat,electricity)? No   Want help with housing or utility concern? No      (!) HOUSING CONCERN PRESENT      11/13/2024   Fall Risk   Fallen 2 or more times in the past year? No    Trouble with walking or balance? No        Patient-reported          11/13/2024   Dental   Dentist two times every year? Yes            11/13/2024   TB Screening   Were you born outside of the [place]? No          11/13/2024   Substance Use  "  Alcohol more than 3/day or more than 7/wk No   Do you use any other substances recreationally? No        Social History     Tobacco Use    Smoking status: Never     Passive exposure: Never    Smokeless tobacco: Never   Vaping Use    Vaping status: Never Used   Substance Use Topics    Alcohol use: Yes         11/13/2024   One time HIV Screening   Previous HIV test? No          11/13/2024   STI Screening   New sexual partner(s) since last STI/HIV test? No      Last PSA:   Prostate Specific Antigen Screen   Date Value Ref Range Status   11/13/2023 0.41 0.00 - 3.50 ng/mL Final   04/26/2021 0.4 0.0 - 3.5 ng/mL Final     ASCVD Risk   The 10-year ASCVD risk score (Casandra GRANT, et al., 2019) is: 4.1%    Values used to calculate the score:      Age: 58 years      Sex: Male      Is Non- : No      Diabetic: No      Tobacco smoker: No      Systolic Blood Pressure: 120 mmHg      Is BP treated: Yes      HDL Cholesterol: 55 mg/dL      Total Cholesterol: 144 mg/dL        Review of Systems  Constitutional, HEENT, cardiovascular, pulmonary, gi and gu systems are negative, except as otherwise noted.       Objective    Exam  /82   Pulse 71   Temp 98.5  F (36.9  C)   Resp 13   Ht 1.92 m (6' 3.59\")   Wt 142.9 kg (315 lb)   SpO2 95%   BMI 38.76 kg/m     Estimated body mass index is 38.76 kg/m  as calculated from the following:    Height as of this encounter: 1.92 m (6' 3.59\").    Weight as of this encounter: 142.9 kg (315 lb).    Physical Exam    General: Alert, in no distress  Skin: No significant lesion seen.  Eyes/nose/throat: Eyes without scleral icterus, eye movements normal, pupils equal and reactive, oropharynx clear,   + Bilateral earwax  MSK: Neck with good ROM  Lymphatic: Neck without adenopathy or masses  Endocrine: Thyroid with no nodules to palpation  Pulm: Lungs clear to auscultation bilaterally  Cardiac: Heart with regular rate and rhythm, no murmur or gallop  GI: Obese, Abdomen " soft, nontender. No palpable enlargement of liver or spleen  MSK: Extremities no tenderness or edema  Neuro: Moves all extremities, without focal weakness  Psych: Alert, normal mental status. 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  Jose already got his seasonal flu shot and updated COVID-vaccine.  He completed his cardiology consultation with Dr. Wu Lane, and his stress test was believed to be a false positive, since his coronary arteries look fine on CT angiography    Prescription issued for hydroxyzine 25 mg at bedtime to help maintain sleep    Stressors  His daughter and her family moved in with Jose and his wife.  Makes for a bit of a crowded household, but that is only temporary, because his daughter and her family are moving back in their own house once repairs are done    Jose's mother  in 2024 at age 80.  Jose's father survives, living in [place].    Jose admits that his work is also been rather stressful.  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    The stress is probably contributed to Jose being a little careless with dietary habits, and may be slacking off on exercise.  He is determined to get good habits going forward into .    Obstructive sleep apnea, uses CPAP every night  Frequent awakenings, Jose would like to get restarted on hydroxyzine used on an intermittent basis, which I think is worth doing.  Will give Jose hydroxyzine 25 mg tablet    Jose struggling a bit with the CPAP equipment, using it may be 4 hours a  night.  His CPAP machine is supposed to transmit data, but Jose says it is having trouble sinking up to the .     Follow-up 10-  Pressure randolph adjusted  CPAP 9 cmH2O download:  72/90 total days of use. 18 nonuse days. 44% days with >4 hours use.  Average use 4 hours 29 minutes per day. Median Leak 1 L/min. 95%ile Leak 14.8 L/min. AHI 0.9.     False positive cardiac stress test  7-    The total Agatston score is 0. A calcium score of zero places the individual in the lowest quartile when compared to an age and gender matched control group.    Normal coronary arteries with no evidence of atherosclerosis.  Nuclear stress test on June 19, 2024:    The nuclear stress test is abnormal.    An exercise stress test was performed following a Hayden protocol with the patient exercising for 8 minutes and 0 seconds. No symptoms reported during stress.    The stress electrocardiogram is negative for inducible ischemic EKG changes.    There is a small area of mild ischemia in the mid to basal inferoseptal segment(s) of the left ventricle.    The left ventricular ejection fraction at stress is 67%.    The patient's exercise capacity is average.    11-  LDL Cholesterol Calculated  <=100 mg/dL 77     Direct Measure HDL  >=40 mg/dL 55     Triglycerides  <150 mg/dL 58     No recurrence (as of November 2024)  Syncope spell after eating at a restaurant in [place] May 2024--probably vasovagal, also consider potential effects from the triamterene hydrochlorothiazide that he was taking for blood pressure, and may be the sildenafil he was using for ED, plus a heavy meal of beer and fish and chips.      Obesity with body mass index almost 38, approaching the morbidly obese threshold of 40  Although Jose does not have a history of diabetes, I think he be an excellent candidate to try metformin which has GLP-1 like effects and might produce meaningful weight loss    Summer 2023 weighed 275    Wt Readings from Last 5  Encounters:   11/14/24 142.9 kg (315 lb)   10/30/24 141.5 kg (312 lb)   07/24/24 138 kg (304 lb 3.2 oz)   05/31/24 138.8 kg (306 lb)   11/13/23 84.1 kg (185 lb 8 oz)      Tried metformin for about 6 week, side effects    He can have all the vegetables he wants  Moderate alcohol intake to at most 1 serving a day which might be a beer or small glass of wine  Get some sort of physical activity every day, such as a brisk walk of 2 miles        History COVID-19 infection, symptoms onset September 2023  while he was traveling in [place]     I told Jose that he should hold off on the bivalent booster for at least 2 months, maybe 3, and that he probably will have at least several months of immunity against omicron     Hypertension in the context of obesity and obstructive sleep apnea  Losartan 100 mg daily  history of ACE inhibitor cough  May 11, 2024 stopped triamterene hydrochlorothiazide      BP Readings from Last 6 Encounters:   11/14/24 120/82   10/30/24 129/88   09/10/24 116/74   07/24/24 102/68   05/31/24 134/82   11/13/23 130/88     Anxiety with OCD tendencies, history of depression, but symptoms have been under decent control since stopping bupropion in May 2020.    He had more significant depression symptoms in his 30s.  Nowadays he manages with cognitive behavioral therapy type of techniques.  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  He Once upon a time use Celexa.       Alcohol consumption sounds modest, about a beer a day, although that is a source of calories.     Migraine/vascular headaches, for which sumatriptan and has worked as abortive therapy, although he does consume a lot of caffeine.      History of left knee meniscus surgery and lateral collateral ligament repair in 1980s, he has implemented strength training to help stabilize his knee.      Status post bilateral inguinal repair surgery 2018    Plantar fasciitis and foot pain,  suggested that Jose try the over-the-counter Superfeet green inserts, available from [employer]     Benign prostatic hyperplasia symptoms with nighttime urination about 2-3  Alpha-blocker tamsulosin 0.4 mg once a day-- helpful    Another medication to consider is finasteride, which gradually shrink the prostate gland over the course of a year by blocking the action of testosterone on prostate gland tissue.      Erectile dysfunction, common type  Generic sildenafil 20 mg tablet, single pill helps    Bilateral earwax, I reminded Jose to use over-the-counter wax dissolving eardrops, example brand Debrox or Murine     Colonoscopy done January 2024, but I am having trouble finding the report  There may have been polyps, because Jose remembers that they told him to come back in 5 years    Vaccines    Immunization History   Administered Date(s) Administered    COVID-19 12+ (Pfizer) 10/20/2023, 09/20/2024    COVID-19 MONOVALENT 12+ (Pfizer) 03/19/2021, 04/09/2021, 12/23/2021    COVID-19 Monovalent 12+ (Pfizer 2022) 08/17/2022    Flu, Unspecified 10/10/2021    INFLUENZA,TRIVALENT (FLUCELVAX) 09/20/2024    Influenza (IIV3) PF 10/11/2005    Influenza Vaccine 18-64 (Flublok) 10/20/2023    Influenza Vaccine >6 months,quad, PF 09/11/2020    Influenza,INJ,MDCK,PF,Quad >6mo(Flucelvax) 09/02/2022    TD,PF 7+ (Tenivac) 10/11/2005    TDAP (Adacel,Boostrix) 04/01/2016    Zoster recombinant adjuvanted (SHINGRIX) 01/31/2020, 04/26/2021       Signed Electronically by: TAHIRA SOLOMON MD

## 2024-11-24 DIAGNOSIS — R35.1 BENIGN PROSTATIC HYPERPLASIA WITH NOCTURIA: ICD-10-CM

## 2024-11-24 DIAGNOSIS — Z00.00 ENCOUNTER FOR GENERAL ADULT MEDICAL EXAMINATION WITHOUT ABNORMAL FINDINGS: ICD-10-CM

## 2024-11-24 DIAGNOSIS — N40.1 BENIGN PROSTATIC HYPERPLASIA WITH NOCTURIA: ICD-10-CM

## 2024-11-25 RX ORDER — TAMSULOSIN HYDROCHLORIDE 0.4 MG/1
0.4 CAPSULE ORAL DAILY
Qty: 90 CAPSULE | Refills: 3 | Status: SHIPPED | OUTPATIENT
Start: 2024-11-25

## 2024-11-25 RX ORDER — CALCIUM CARBONATE/VITAMIN D3 500MG-5MCG
1 TABLET ORAL DAILY
Qty: 90 TABLET | Refills: 3 | Status: SHIPPED | OUTPATIENT
Start: 2024-11-25

## 2024-12-14 DIAGNOSIS — G44.1 VASCULAR HEADACHE: ICD-10-CM

## 2024-12-16 RX ORDER — SUMATRIPTAN SUCCINATE 100 MG/1
100 TABLET ORAL
Qty: 10 TABLET | Refills: 5 | Status: SHIPPED | OUTPATIENT
Start: 2024-12-16

## 2024-12-31 DIAGNOSIS — N52.9 ERECTILE DYSFUNCTION, UNSPECIFIED ERECTILE DYSFUNCTION TYPE: ICD-10-CM

## 2024-12-31 RX ORDER — SILDENAFIL CITRATE 20 MG/1
TABLET ORAL
Qty: 45 TABLET | Refills: 1 | Status: SHIPPED | OUTPATIENT
Start: 2024-12-31

## 2025-01-04 DIAGNOSIS — N40.1 BENIGN PROSTATIC HYPERPLASIA WITH NOCTURIA: ICD-10-CM

## 2025-01-04 DIAGNOSIS — R35.1 BENIGN PROSTATIC HYPERPLASIA WITH NOCTURIA: ICD-10-CM

## 2025-01-06 RX ORDER — TAMSULOSIN HYDROCHLORIDE 0.4 MG/1
0.4 CAPSULE ORAL DAILY
Qty: 90 CAPSULE | Refills: 3 | OUTPATIENT
Start: 2025-01-06

## 2025-01-07 ENCOUNTER — VIRTUAL VISIT (OUTPATIENT)
Dept: FAMILY MEDICINE | Facility: CLINIC | Age: 60
End: 2025-01-07
Payer: COMMERCIAL

## 2025-01-07 DIAGNOSIS — G47.33 OSA (OBSTRUCTIVE SLEEP APNEA): Primary | ICD-10-CM

## 2025-01-07 DIAGNOSIS — R09.89 CHEST CONGESTION: ICD-10-CM

## 2025-01-07 DIAGNOSIS — R05.1 ACUTE COUGH: ICD-10-CM

## 2025-01-07 DIAGNOSIS — E66.01 MORBID OBESITY (H): ICD-10-CM

## 2025-01-07 PROCEDURE — 98005 SYNCH AUDIO-VIDEO EST LOW 20: CPT | Performed by: NURSE PRACTITIONER

## 2025-01-07 RX ORDER — BENZONATATE 200 MG/1
200 CAPSULE ORAL 3 TIMES DAILY PRN
Qty: 30 CAPSULE | Refills: 0 | Status: SHIPPED | OUTPATIENT
Start: 2025-01-07

## 2025-01-07 RX ORDER — DOXYCYCLINE 100 MG/1
100 TABLET ORAL 2 TIMES DAILY WITH MEALS
Qty: 14 TABLET | Refills: 0 | Status: SHIPPED | OUTPATIENT
Start: 2025-01-07 | End: 2025-01-14

## 2025-01-07 NOTE — PROGRESS NOTES
"Jose is a 59 year old who is being evaluated via a billable video visit.    How would you like to obtain your AVS? MyChart  If the video visit is dropped, the invitation should be resent by: Text to cell phone: [phone]  Will anyone else be joining your video visit? No      Assessment & Plan     GARCÍA (obstructive sleep apnea)  **    Acute cough  **  - doxycycline monohydrate (ADOXA) 100 MG tablet  Dispense: 14 tablet; Refill: 0  - benzonatate (TESSALON) 200 MG capsule  Dispense: 30 capsule; Refill: 0    Chest congestion  **  - doxycycline monohydrate (ADOXA) 100 MG tablet  Dispense: 14 tablet; Refill: 0  - benzonatate (TESSALON) 200 MG capsule  Dispense: 30 capsule; Refill: 0    Morbid obesity (H)  **    -I offered to do influenza and COVID swab testings, I suggested that he get these done, however with his schedule he is not able to make it to the clinic.  His symptoms started over 5 days and would not be eligible for COVID medications.  I discussed that his etiology is not clear if viral or bacterial.  It appears that his symptoms have improved but then worse again.  He does have a history of pneumonia many years ago per his recollection.  Will treat with doxycycline since high risk patient, symptoms have prolonged, appear to be better and then worse again.  I discussed sinus congestion conservative measures today.  Warning signs were discussed and when to seek additional care.  Tessalon Perles may be used.  He will continue to use CPAP machine.  He verbalized understanding.   -Medication side effects discussed.       BMI  Estimated body mass index is 38.76 kg/m  as calculated from the following:    Height as of 11/14/24: 1.92 m (6' 3.59\").    Weight as of 11/14/24: 142.9 kg (315 lb).   Weight management plan: Patient was referred to their PCP to discuss a diet and exercise plan.      Subjective   Jose is a 59 year old, presenting for the following health issues:  Cough    -He stated around January 1 or 2, he " developed cold symptoms.  But this prior weekend starting on Friday to Sunday, his symptoms were worse.  [place] was [place].  He is noticing sinus pressure bilateral, to the forehead regions, and this symptom is new as of today or yesterday.  He is noticing chest congestion with cough.  He is noticing aches and chills.  He is noticing some raspiness, but no shortness of breath.  He does not have a thermometer at home.  He felt better, but then got worse again.  He feels that it ebbs and flows.  He did not do a COVID test.  He has a history of pneumonia over 10 years ago.  He does not have pain in his chest.         11/14/2024     7:20 AM   Additional Questions   Roomed by Angie ANTHONY   Accompanied by joe TOWNSEND                 Objective           Vitals:  No vitals were obtained today due to virtual visit.    Physical Exam   GENERAL: alert and no distress  EYES: Eyes grossly normal to inspection.  No discharge or erythema, or obvious scleral/conjunctival abnormalities.  RESP: No audible wheeze, cough, or visible cyanosis.    SKIN: Visible skin clear. No significant rash, abnormal pigmentation or lesions.  NEURO: Cranial nerves grossly intact.  Mentation and speech appropriate for age.  PSYCH: Appropriate affect, tone, and pace of words    Office Visit on 11/14/2024   Component Date Value Ref Range Status    Prostate Specific Antigen Screen 11/14/2024 0.53  0.00 - 3.50 ng/mL Final    Sodium 11/14/2024 138  135 - 145 mmol/L Final    Potassium 11/14/2024 4.4  3.4 - 5.3 mmol/L Final    Carbon Dioxide (CO2) 11/14/2024 24  22 - 29 mmol/L Final    Anion Gap 11/14/2024 10  7 - 15 mmol/L Final    Urea Nitrogen 11/14/2024 19.3  6.0 - 20.0 mg/dL Final    Creatinine 11/14/2024 0.98  0.67 - 1.17 mg/dL Final    GFR Estimate 11/14/2024 >90  >60 mL/min/1.73m2 Final    eGFR calculated using 2021 CKD-EPI equation.    Calcium 11/14/2024 9.1  8.8 - 10.4 mg/dL Final    Reference intervals for this test were updated on  7/16/2024 to reflect our healthy population more accurately. There may be differences in the flagging of prior results with similar values performed with this method. Those prior results can be interpreted in the context of the updated reference intervals.    Chloride 11/14/2024 104  98 - 107 mmol/L Final    Glucose 11/14/2024 110 (H)  70 - 99 mg/dL Final    Alkaline Phosphatase 11/14/2024 63  40 - 150 U/L Final    AST 11/14/2024 20  0 - 45 U/L Final    ALT 11/14/2024 19  0 - 70 U/L Final    Protein Total 11/14/2024 7.1  6.4 - 8.3 g/dL Final    Albumin 11/14/2024 4.3  3.5 - 5.2 g/dL Final    Bilirubin Total 11/14/2024 0.6  <=1.2 mg/dL Final    Patient Fasting > 8hrs? 11/14/2024 Yes   Final    WBC Count 11/14/2024 8.6  4.0 - 11.0 10e3/uL Final    RBC Count 11/14/2024 4.74  4.40 - 5.90 10e6/uL Final    Hemoglobin 11/14/2024 15.0  13.3 - 17.7 g/dL Final    Hematocrit 11/14/2024 45.0  40.0 - 53.0 % Final    MCV 11/14/2024 95  78 - 100 fL Final    MCH 11/14/2024 31.6  26.5 - 33.0 pg Final    MCHC 11/14/2024 33.3  31.5 - 36.5 g/dL Final    RDW 11/14/2024 11.6  10.0 - 15.0 % Final    Platelet Count 11/14/2024 234  150 - 450 10e3/uL Final    TSH 11/14/2024 0.98  0.30 - 4.20 uIU/mL Final    Cholesterol 11/14/2024 143  <200 mg/dL Final    Triglycerides 11/14/2024 83  <150 mg/dL Final    Direct Measure HDL 11/14/2024 45  >=40 mg/dL Final    LDL Cholesterol Calculated 11/14/2024 81  <100 mg/dL Final    Non HDL Cholesterol 11/14/2024 98  <130 mg/dL Final    Patient Fasting > 8hrs? 11/14/2024 Yes   Final    Estimated Average Glucose 11/14/2024 108  <117 mg/dL Final    Hemoglobin A1C 11/14/2024 5.4  0.0 - 5.6 % Final    Normal <5.7%   Prediabetes 5.7-6.4%    Diabetes 6.5% or higher     Note: Adopted from ADA consensus guidelines.         Video-Visit Details    Type of service:  Video Visit   Originating Location (pt. Location): Home    Distant Location (provider location):  On-site  Platform used for Video Visit: Don May  Electronically by: SAM Laird CNP

## 2025-04-03 DIAGNOSIS — F51.01 PRIMARY INSOMNIA: ICD-10-CM

## 2025-04-03 RX ORDER — HYDROXYZINE HYDROCHLORIDE 25 MG/1
25 TABLET, FILM COATED ORAL
Qty: 90 TABLET | Refills: 2 | Status: SHIPPED | OUTPATIENT
Start: 2025-04-03

## 2025-04-29 ENCOUNTER — MYC MEDICAL ADVICE (OUTPATIENT)
Dept: INTERNAL MEDICINE | Facility: CLINIC | Age: 60
End: 2025-04-29
Payer: COMMERCIAL

## 2025-04-29 DIAGNOSIS — G89.29 CHRONIC PAIN OF LEFT KNEE: Primary | ICD-10-CM

## 2025-04-29 DIAGNOSIS — M25.562 CHRONIC PAIN OF LEFT KNEE: Primary | ICD-10-CM

## 2025-05-07 ENCOUNTER — PATIENT OUTREACH (OUTPATIENT)
Dept: CARE COORDINATION | Facility: CLINIC | Age: 60
End: 2025-05-07
Payer: COMMERCIAL

## 2025-05-25 DIAGNOSIS — I10 ESSENTIAL HYPERTENSION, BENIGN: ICD-10-CM

## 2025-05-27 RX ORDER — LOSARTAN POTASSIUM 100 MG/1
100 TABLET ORAL DAILY
Qty: 90 TABLET | Refills: 2 | Status: SHIPPED | OUTPATIENT
Start: 2025-05-27